# Patient Record
Sex: MALE | Race: WHITE | NOT HISPANIC OR LATINO | Employment: FULL TIME | URBAN - METROPOLITAN AREA
[De-identification: names, ages, dates, MRNs, and addresses within clinical notes are randomized per-mention and may not be internally consistent; named-entity substitution may affect disease eponyms.]

---

## 2017-07-28 ENCOUNTER — APPOINTMENT (OUTPATIENT)
Dept: ULTRASOUND IMAGING | Facility: CLINIC | Age: 32
End: 2017-07-28
Attending: FAMILY MEDICINE
Payer: COMMERCIAL

## 2017-07-28 ENCOUNTER — APPOINTMENT (OUTPATIENT)
Dept: CT IMAGING | Facility: CLINIC | Age: 32
End: 2017-07-28
Attending: FAMILY MEDICINE
Payer: COMMERCIAL

## 2017-07-28 ENCOUNTER — HOSPITAL ENCOUNTER (EMERGENCY)
Facility: CLINIC | Age: 32
Discharge: HOME | End: 2017-07-28
Attending: FAMILY MEDICINE | Admitting: FAMILY MEDICINE
Payer: COMMERCIAL

## 2017-07-28 VITALS
TEMPERATURE: 98.3 F | RESPIRATION RATE: 16 BRPM | OXYGEN SATURATION: 98 % | HEART RATE: 82 BPM | DIASTOLIC BLOOD PRESSURE: 86 MMHG | SYSTOLIC BLOOD PRESSURE: 132 MMHG

## 2017-07-28 DIAGNOSIS — N20.1 CALCULUS OF URETER: ICD-10-CM

## 2017-07-28 DIAGNOSIS — N13.2 HYDRONEPHROSIS WITH RENAL AND URETERAL CALCULUS OBSTRUCTION: ICD-10-CM

## 2017-07-28 DIAGNOSIS — G35 MULTIPLE SCLEROSIS (H): ICD-10-CM

## 2017-07-28 LAB
ALBUMIN UR-MCNC: 10 MG/DL
AMORPH CRY #/AREA URNS HPF: ABNORMAL /HPF
ANION GAP SERPL CALCULATED.3IONS-SCNC: 11 MMOL/L (ref 3–14)
APPEARANCE UR: ABNORMAL
BASOPHILS # BLD AUTO: 0 10E9/L (ref 0–0.2)
BASOPHILS NFR BLD AUTO: 0.2 %
BILIRUB UR QL STRIP: NEGATIVE
BUN SERPL-MCNC: 11 MG/DL (ref 7–30)
CALCIUM SERPL-MCNC: 9.4 MG/DL (ref 8.5–10.1)
CHLORIDE SERPL-SCNC: 108 MMOL/L (ref 94–109)
CO2 SERPL-SCNC: 25 MMOL/L (ref 20–32)
COLOR UR AUTO: YELLOW
CREAT SERPL-MCNC: 0.97 MG/DL (ref 0.66–1.25)
DIFFERENTIAL METHOD BLD: NORMAL
EOSINOPHIL # BLD AUTO: 0 10E9/L (ref 0–0.7)
EOSINOPHIL NFR BLD AUTO: 0.5 %
ERYTHROCYTE [DISTWIDTH] IN BLOOD BY AUTOMATED COUNT: 13.6 % (ref 10–15)
GFR SERPL CREATININE-BSD FRML MDRD: NORMAL ML/MIN/1.7M2
GLUCOSE SERPL-MCNC: 97 MG/DL (ref 70–99)
GLUCOSE UR STRIP-MCNC: NEGATIVE MG/DL
HCT VFR BLD AUTO: 43.9 % (ref 40–53)
HGB BLD-MCNC: 14.4 G/DL (ref 13.3–17.7)
HGB UR QL STRIP: ABNORMAL
IMM GRANULOCYTES # BLD: 0 10E9/L (ref 0–0.4)
IMM GRANULOCYTES NFR BLD: 0.2 %
KETONES UR STRIP-MCNC: NEGATIVE MG/DL
LEUKOCYTE ESTERASE UR QL STRIP: NEGATIVE
LYMPHOCYTES # BLD AUTO: 0.9 10E9/L (ref 0.8–5.3)
LYMPHOCYTES NFR BLD AUTO: 11.3 %
MCH RBC QN AUTO: 29.6 PG (ref 26.5–33)
MCHC RBC AUTO-ENTMCNC: 32.8 G/DL (ref 31.5–36.5)
MCV RBC AUTO: 90 FL (ref 78–100)
MONOCYTES # BLD AUTO: 0.4 10E9/L (ref 0–1.3)
MONOCYTES NFR BLD AUTO: 4.4 %
MUCOUS THREADS #/AREA URNS LPF: PRESENT /LPF
NEUTROPHILS # BLD AUTO: 6.9 10E9/L (ref 1.6–8.3)
NEUTROPHILS NFR BLD AUTO: 83.4 %
NITRATE UR QL: NEGATIVE
NRBC # BLD AUTO: 0 10*3/UL
NRBC BLD AUTO-RTO: 0 /100
PH UR STRIP: 7.5 PH (ref 5–7)
PLATELET # BLD AUTO: 164 10E9/L (ref 150–450)
POTASSIUM SERPL-SCNC: 3.9 MMOL/L (ref 3.4–5.3)
RBC # BLD AUTO: 4.87 10E12/L (ref 4.4–5.9)
RBC #/AREA URNS AUTO: 44 /HPF (ref 0–2)
SODIUM SERPL-SCNC: 144 MMOL/L (ref 133–144)
SP GR UR STRIP: 1.02 (ref 1–1.03)
URN SPEC COLLECT METH UR: ABNORMAL
UROBILINOGEN UR STRIP-MCNC: NORMAL MG/DL (ref 0–2)
WBC # BLD AUTO: 8.2 10E9/L (ref 4–11)
WBC #/AREA URNS AUTO: 0 /HPF (ref 0–2)

## 2017-07-28 PROCEDURE — 81001 URINALYSIS AUTO W/SCOPE: CPT | Performed by: FAMILY MEDICINE

## 2017-07-28 PROCEDURE — 85025 COMPLETE CBC W/AUTO DIFF WBC: CPT | Performed by: FAMILY MEDICINE

## 2017-07-28 PROCEDURE — 25000128 H RX IP 250 OP 636: Performed by: FAMILY MEDICINE

## 2017-07-28 PROCEDURE — 80048 BASIC METABOLIC PNL TOTAL CA: CPT | Performed by: FAMILY MEDICINE

## 2017-07-28 PROCEDURE — 99284 EMERGENCY DEPT VISIT MOD MDM: CPT | Mod: 25

## 2017-07-28 PROCEDURE — 96374 THER/PROPH/DIAG INJ IV PUSH: CPT

## 2017-07-28 PROCEDURE — 93976 VASCULAR STUDY: CPT

## 2017-07-28 PROCEDURE — 99284 EMERGENCY DEPT VISIT MOD MDM: CPT | Mod: Z6 | Performed by: FAMILY MEDICINE

## 2017-07-28 PROCEDURE — 96375 TX/PRO/DX INJ NEW DRUG ADDON: CPT

## 2017-07-28 PROCEDURE — 96361 HYDRATE IV INFUSION ADD-ON: CPT | Mod: 59

## 2017-07-28 PROCEDURE — 74176 CT ABD & PELVIS W/O CONTRAST: CPT

## 2017-07-28 RX ORDER — SODIUM CHLORIDE 9 MG/ML
1000 INJECTION, SOLUTION INTRAVENOUS CONTINUOUS
Status: DISCONTINUED | OUTPATIENT
Start: 2017-07-28 | End: 2017-07-28 | Stop reason: HOSPADM

## 2017-07-28 RX ORDER — TAMSULOSIN HYDROCHLORIDE 0.4 MG/1
0.4 CAPSULE ORAL DAILY
Qty: 10 CAPSULE | Refills: 0 | Status: SHIPPED | OUTPATIENT
Start: 2017-07-28 | End: 2017-08-07

## 2017-07-28 RX ORDER — ONDANSETRON 2 MG/ML
4 INJECTION INTRAMUSCULAR; INTRAVENOUS ONCE
Status: COMPLETED | OUTPATIENT
Start: 2017-07-28 | End: 2017-07-28

## 2017-07-28 RX ORDER — IBUPROFEN 800 MG/1
800 TABLET, FILM COATED ORAL EVERY 8 HOURS PRN
Qty: 20 TABLET | Refills: 0 | Status: SHIPPED | OUTPATIENT
Start: 2017-07-28 | End: 2017-08-05

## 2017-07-28 RX ORDER — GLATIRAMER ACETATE 20 MG/ML
20 INJECTION, SOLUTION SUBCUTANEOUS DAILY
COMMUNITY

## 2017-07-28 RX ORDER — OXYCODONE HYDROCHLORIDE 5 MG/1
5 TABLET ORAL EVERY 6 HOURS PRN
Qty: 20 TABLET | Refills: 0 | Status: SHIPPED | OUTPATIENT
Start: 2017-07-28

## 2017-07-28 RX ORDER — KETOROLAC TROMETHAMINE 30 MG/ML
30 INJECTION, SOLUTION INTRAMUSCULAR; INTRAVENOUS ONCE
Status: COMPLETED | OUTPATIENT
Start: 2017-07-28 | End: 2017-07-28

## 2017-07-28 RX ADMIN — SODIUM CHLORIDE 1000 ML: 9 INJECTION, SOLUTION INTRAVENOUS at 17:27

## 2017-07-28 RX ADMIN — KETOROLAC TROMETHAMINE 30 MG: 30 INJECTION, SOLUTION INTRAMUSCULAR at 17:25

## 2017-07-28 RX ADMIN — ONDANSETRON 4 MG: 2 INJECTION INTRAMUSCULAR; INTRAVENOUS at 17:25

## 2017-07-28 RX ADMIN — SODIUM CHLORIDE 1000 ML: 9 INJECTION, SOLUTION INTRAVENOUS at 19:09

## 2017-07-28 NOTE — ED PROVIDER NOTES
History     Chief Complaint   Patient presents with     Testicular/scrotal Pain     pain in L testicle increasing since this am. + frequent urge to void. Referred here from urgent care.     HPI  Alirio Nolan is a 31 year old male with a history of MS who presents to the Emergency Department for evaluation of testicular pain. Patient reports pain in the left testicle starting this morning and has been worsening since. He left work today around 2:00PM due to pain. He notes sexual activity 3 days ago. He complain of urgency to urinate. He has some nausea secondary to pain.     Past Medical History:   Diagnosis Date     MS (multiple sclerosis) (H)        History reviewed. No pertinent surgical history.    No family history on file.    Social History   Substance Use Topics     Smoking status: Current Every Day Smoker     Smokeless tobacco: Never Used     Alcohol use No       Current Facility-Administered Medications   Medication     0.9% sodium chloride infusion     Current Outpatient Prescriptions   Medication     glatiramer (COPAXONE) 20 MG/ML injection     oxyCODONE (ROXICODONE) 5 MG IR tablet     tamsulosin (FLOMAX) 0.4 MG capsule     ibuprofen (ADVIL/MOTRIN) 800 MG tablet      No Known Allergies    I have reviewed the Medications, Allergies, Past Medical and Surgical History, and Social History in the Epic system.    Review of Systems  All other systems were reviewed and are negative    Physical Exam      Physical Exam   Constitutional: He is oriented to person, place, and time. He appears well-developed and well-nourished.   HENT:   Head: Normocephalic and atraumatic.   Mouth/Throat: Oropharynx is clear and moist.   Eyes: EOM are normal. Pupils are equal, round, and reactive to light.   Neck: Normal range of motion. Neck supple. No tracheal deviation present. No thyromegaly present.   Cardiovascular: Normal rate, regular rhythm, normal heart sounds and intact distal pulses.  Exam reveals no gallop and no  friction rub.    No murmur heard.  Pulmonary/Chest: Effort normal and breath sounds normal. He exhibits no tenderness.   Abdominal: Soft. Bowel sounds are normal. He exhibits no distension and no mass. There is no tenderness.   Genitourinary: Cremasteric reflex is present. Right testis shows no mass, no swelling and no tenderness. Left testis shows tenderness. Left testis shows no mass and no swelling.   Musculoskeletal: He exhibits no edema or tenderness.   Neurological: He is alert and oriented to person, place, and time. No cranial nerve deficit. Coordination normal.   Skin: Skin is warm and dry. No rash noted.   Psychiatric: He has a normal mood and affect. His behavior is normal.   Nursing note and vitals reviewed.      ED Course   4:23 PM  The patient was seen and examined by Lexx Rodriguez MD in Room 20.     ED Course     Procedures             Critical Care time:  none               Labs Ordered and Resulted from Time of ED Arrival Up to the Time of Departure from the ED   ROUTINE UA WITH MICROSCOPIC REFLEX TO CULTURE - Abnormal; Notable for the following:        Result Value    Blood Urine Small (*)     pH Urine 7.5 (*)     Protein Albumin Urine 10 (*)     RBC Urine 44 (*)     Mucous Urine Present (*)     Amorphous Crystals Few (*)     All other components within normal limits   CBC WITH PLATELETS DIFFERENTIAL   BASIC METABOLIC PANEL            Assessments & Plan (with Medical Decision Making)   Patient with a history of kidney stones presenting with acute left testicular and groin pain.  Differential diagnosis includes testicular torsion, epididymitis, varicocele, ureterolithiasis, pyelonephritis, hernia.  His exam does not reveal any significant swelling or matt tenderness of the testicle, and testicular ultrasound was unremarkable.  Labs were unremarkable his pain was improved dramatically with Toradol.  CT confirms 2 mm stone with obstruction at the left UVJ.  Patient is feeling as though his pain is  well-controlled like to go home.  He is familiar with the clinical expected course of kidney stones.  Discussed expected course, need for follow up, and indications for return with the patient.  See discharge instructions.      I have reviewed the nursing notes.    I have reviewed the findings, diagnosis, plan and need for follow up with the patient.    New Prescriptions    IBUPROFEN (ADVIL/MOTRIN) 800 MG TABLET    Take 1 tablet (800 mg) by mouth every 8 hours as needed for moderate pain    OXYCODONE (ROXICODONE) 5 MG IR TABLET    Take 1 tablet (5 mg) by mouth every 6 hours as needed for pain    TAMSULOSIN (FLOMAX) 0.4 MG CAPSULE    Take 1 capsule (0.4 mg) by mouth daily for 10 doses       Final diagnoses:   Calculus of ureter   I, Jaimee Rodriguez, am serving as a trained medical scribe to document services personally performed by Lexx Rodriguez MD, based on the provider's statements to me.      I, Lexx Rodriguez MD, was physically present and have reviewed and verified the accuracy of this note documented by Jaimee Rodriguez.      7/28/2017   [facility], [place], EMERGENCY DEPARTMENT     Yousif Rodriguez MD  07/28/17 4502

## 2017-07-28 NOTE — ED AVS SNAPSHOT
[facility], [place], Emergency Department    [address]    [facility] [address]    Phone:  [phone]    Fax:  [phone]                                       Alirio Nolan   MRN: [id number]    Department:  [facility], Emergency Department   Date of Visit:  7/28/2017           After Visit Summary Signature Page     I have received my discharge instructions, and my questions have been answered. I have discussed any challenges I see with this plan with the nurse or doctor.    ..........................................................................................................................................  Patient/Patient Representative Signature      ..........................................................................................................................................  Patient Representative Print Name and Relationship to Patient    ..................................................               ................................................  Date                                            Time    ..........................................................................................................................................  Reviewed by Signature/Title    ...................................................              ..............................................  Date                                                            Time

## 2017-07-28 NOTE — ED AVS SNAPSHOT
[facility], Emergency Department    [address]    [facility] [place] [address]    Phone:  [phone]    Fax:  [phone]                                       Alirio Nolan   MRN: [id number]    Department:  [facility], Emergency Department   Date of Visit:  7/28/2017           Patient Information     Date Of Birth          1985        Your diagnoses for this visit were:     Calculus of ureter        You were seen by Yousif Rodriguez MD.        Discharge Instructions       Thank you for choosing [facility].     Please closely monitor for further symptoms. Return to the Emergency Department if you develop any new or worsening signs or symptoms.    If you received any opiate pain medications or sedatives during your visit, please do not drive for at least 8 hours.     Labs, cultures or final xray interpretations may still need to be reviewed.  We will call you if your plan of care needs to be changed.    Please make an appointment to follow up with Urology Clinic (phone: [phone]) in 7-14 days unless symptoms completely resolve.      Treating Kidney Stones: Expectant Therapy  Most kidney stones are about the size of a grape seed. Stones of this size are small enough to pass naturally. Once it is passed, a stone can be analyzed. This wait-and-see approach is called expectant therapy. Small stones can often be passed with expectant therapy. If pain is a problem, ask your healthcare provider about pain medicines. Then follow his or her directions on how much water to drink. Drinking more water creates more urine to flush out your stone. Also be sure to strain your urine. Take any stones you pass to your provider for analysis.    Drink lots of water  Drinking lots of water may help your stone pass. Water also dilutes the chemicals in your urine. This reduces your risk of forming new stones. You may be told to drink 8, 12-ounce glasses of water a day. Avoid liquids that  dehydrate you, such as those containing caffeine or alcohol.  Strain your urine  Straining your urine lets you collect your stone for analysis. Use the strainer each time you urinate. Strain your urine for as long as your healthcare provider suggests. Watch for brown, tan, gold, or black specks or tiny tony. These may be kidney stones.  Take your medicine  Your healthcare provider may give you medicine that makes it more likely for you to pass the kidney stone.   Follow up with your healthcare provider  Follow up by taking any stones you find to your provider for analysis. The type of stone you have determines your diet and prevention program. You may need more tests in the future. These tests will ensure that new stones are not forming.  Date Last Reviewed: 1/1/2017 2000-2017 [employer]. [address] [place] [address], [place] [address]. All rights reserved. This information is not intended as a substitute for professional medical care. Always follow your healthcare professional's instructions.            24 Hour Appointment Hotline       To make an appointment at any [facility], call [phone] ([phone]). If you don't have a family doctor or clinic, we will help you find one. [facility] clinics are conveniently located to serve the needs of you and your family.             Review of your medicines      START taking        Dose / Directions Last dose taken    ibuprofen 800 MG tablet   Commonly known as:  ADVIL/MOTRIN   Dose:  800 mg   Quantity:  20 tablet        Take 1 tablet (800 mg) by mouth every 8 hours as needed for moderate pain   Refills:  0        oxyCODONE 5 MG IR tablet   Commonly known as:  ROXICODONE   Dose:  5 mg   Quantity:  20 tablet        Take 1 tablet (5 mg) by mouth every 6 hours as needed for pain   Refills:  0        tamsulosin 0.4 MG capsule   Commonly known as:  FLOMAX   Dose:  0.4 mg   Quantity:  10 capsule        Take 1 capsule (0.4 mg) by mouth daily for 10 doses    Refills:  0          Our records show that you are taking the medicines listed below. If these are incorrect, please call your family doctor or clinic.        Dose / Directions Last dose taken    glatiramer 20 MG/ML injection   Commonly known as:  COPAXONE   Dose:  20 mg        Inject 20 mg Subcutaneous daily   Refills:  0                Prescriptions were sent or printed at these locations (3 Prescriptions)                   Other Prescriptions                Printed at Department/Unit printer (3 of 3)         oxyCODONE (ROXICODONE) 5 MG IR tablet               tamsulosin (FLOMAX) 0.4 MG capsule               ibuprofen (ADVIL/MOTRIN) 800 MG tablet                Procedures and tests performed during your visit     Abd/pelvis CT no contrast - Stone Protocol    Basic metabolic panel    CBC with platelets differential    UA with Microscopic reflex to Culture    US Testicular & Scrotum w Doppler Ltd      Orders Needing Specimen Collection     None      Pending Results     No orders found from 7/26/2017 to 7/29/2017.            Pending Culture Results     No orders found from 7/26/2017 to 7/29/2017.            Pending Results Instructions     If you had any lab results that were not finalized at the time of your Discharge, you can call the ED Lab Result RN at [phone]. You will be contacted by this team for any positive Lab results or changes in treatment. The nurses are available 7 days a week from 10A to 6:30P.  You can leave a message 24 hours per day and they will return your call.        Thank you for choosing [facility]       Thank you for choosing [facility] for your care. Our goal is always to provide you with excellent care. Hearing back from our patients is one way we can continue to improve our services. Please take a few minutes to complete the written survey that you may receive in the mail after you visit with us. Thank you!        [employer] Information     [employer] lets you send messages to your doctor,  "view your test results, renew your prescriptions, schedule appointments and more. To sign up, go to [email or web address] . Click on \"Log in\" on the left side of the screen, which will take you to the Welcome page. Then click on \"Sign up Now\" on the right side of the page.     You will be asked to enter the access code listed below, as well as some personal information. Please follow the directions to create your username and password.     Your access code is: [email or web address]  Expires: 10/26/2017  7:45 PM     Your access code will  in 90 days. If you need help or a new code, please call your [facility] clinic or [phone].        Care EveryWhere ID     This is your Care EveryWhere ID. This could be used by other organizations to access your [facility] medical records  [id number]        Equal Access to Services     ДМИТРИЙ NDIAYE : Hadii rocio Johnson, waaxda luanita, qaybta kaalmada modesta, anirudh orta . So [facility] [phone].    ATENCIÓN: Si habla español, tiene a pleitez disposición servicios gratuitos de asistencia lingüística. Llame al [phone].    We comply with applicable federal civil rights laws and [place] laws. We do not discriminate on the basis of race, color, national origin, age, disability sex, sexual orientation or gender identity.            After Visit Summary       This is your record. Keep this with you and show to your community pharmacist(s) and doctor(s) at your next visit.                  "

## 2017-07-29 NOTE — DISCHARGE INSTRUCTIONS
Thank you for choosing [facility].     Please closely monitor for further symptoms. Return to the Emergency Department if you develop any new or worsening signs or symptoms.    If you received any opiate pain medications or sedatives during your visit, please do not drive for at least 8 hours.     Labs, cultures or final xray interpretations may still need to be reviewed.  We will call you if your plan of care needs to be changed.    Please make an appointment to follow up with Urology Clinic (phone: [phone]) in 7-14 days unless symptoms completely resolve.      Treating Kidney Stones: Expectant Therapy  Most kidney stones are about the size of a grape seed. Stones of this size are small enough to pass naturally. Once it is passed, a stone can be analyzed. This wait-and-see approach is called expectant therapy. Small stones can often be passed with expectant therapy. If pain is a problem, ask your healthcare provider about pain medicines. Then follow his or her directions on how much water to drink. Drinking more water creates more urine to flush out your stone. Also be sure to strain your urine. Take any stones you pass to your provider for analysis.    Drink lots of water  Drinking lots of water may help your stone pass. Water also dilutes the chemicals in your urine. This reduces your risk of forming new stones. You may be told to drink 8, 12-ounce glasses of water a day. Avoid liquids that dehydrate you, such as those containing caffeine or alcohol.  Strain your urine  Straining your urine lets you collect your stone for analysis. Use the strainer each time you urinate. Strain your urine for as long as your healthcare provider suggests. Watch for brown, tan, gold, or black specks or tiny tony. These may be kidney stones.  Take your medicine  Your healthcare provider may give you medicine that makes it more likely for you to pass the kidney stone.   Follow up with your  healthcare provider  Follow up by taking any stones you find to your provider for analysis. The type of stone you have determines your diet and prevention program. You may need more tests in the future. These tests will ensure that new stones are not forming.  Date Last Reviewed: 1/1/2017 2000-2017 [employer]. [address] [place] [address], [place] [address]. All rights reserved. This information is not intended as a substitute for professional medical care. Always follow your healthcare professional's instructions.

## 2024-10-14 ENCOUNTER — APPOINTMENT (OUTPATIENT)
Dept: GENERAL RADIOLOGY | Facility: CLINIC | Age: 39
DRG: 638 | End: 2024-10-14
Attending: EMERGENCY MEDICINE
Payer: COMMERCIAL

## 2024-10-14 ENCOUNTER — HOSPITAL ENCOUNTER (INPATIENT)
Facility: CLINIC | Age: 39
LOS: 3 days | Discharge: HOME | DRG: 638 | End: 2024-10-17
Attending: EMERGENCY MEDICINE | Admitting: INTERNAL MEDICINE
Payer: COMMERCIAL

## 2024-10-14 DIAGNOSIS — N48.29 OTHER INFLAMMATORY DISORDERS OF PENIS: ICD-10-CM

## 2024-10-14 DIAGNOSIS — E11.9 TYPE 2 DIABETES MELLITUS WITHOUT COMPLICATION, UNSPECIFIED WHETHER LONG TERM INSULIN USE (H): ICD-10-CM

## 2024-10-14 DIAGNOSIS — H53.8 BLURRED VISION: Primary | ICD-10-CM

## 2024-10-14 DIAGNOSIS — E11.10 DIABETIC KETOACIDOSIS WITHOUT COMA ASSOCIATED WITH TYPE 2 DIABETES MELLITUS (H): ICD-10-CM

## 2024-10-14 DIAGNOSIS — L02.92 BOIL: ICD-10-CM

## 2024-10-14 LAB
ALBUMIN SERPL BCG-MCNC: 3.6 G/DL (ref 3.5–5.2)
ALBUMIN SERPL BCG-MCNC: 3.8 G/DL (ref 3.5–5.2)
ALBUMIN UR-MCNC: 50 MG/DL
ALP SERPL-CCNC: 104 U/L (ref 40–150)
ALP SERPL-CCNC: 105 U/L (ref 40–150)
ALT SERPL W P-5'-P-CCNC: 48 U/L (ref 0–70)
ALT SERPL W P-5'-P-CCNC: 49 U/L (ref 0–70)
ANION GAP SERPL CALCULATED.3IONS-SCNC: 20 MMOL/L (ref 7–15)
ANION GAP SERPL CALCULATED.3IONS-SCNC: 24 MMOL/L (ref 7–15)
APPEARANCE UR: CLEAR
AST SERPL W P-5'-P-CCNC: 28 U/L (ref 0–45)
AST SERPL W P-5'-P-CCNC: 28 U/L (ref 0–45)
B-OH-BUTYR SERPL-SCNC: 7.16 MMOL/L
B-OH-BUTYR SERPL-SCNC: 7.24 MMOL/L
BASE EXCESS BLDV CALC-SCNC: -15.5 MMOL/L (ref -3–3)
BASOPHILS # BLD AUTO: 0 10E3/UL (ref 0–0.2)
BASOPHILS NFR BLD AUTO: 1 %
BILIRUB DIRECT SERPL-MCNC: <0.2 MG/DL (ref 0–0.3)
BILIRUB SERPL-MCNC: 0.4 MG/DL
BILIRUB SERPL-MCNC: 0.5 MG/DL
BILIRUB UR QL STRIP: NEGATIVE
BUN SERPL-MCNC: 7.6 MG/DL (ref 6–20)
BUN SERPL-MCNC: 7.8 MG/DL (ref 6–20)
CALCIUM SERPL-MCNC: 7.5 MG/DL (ref 8.8–10.4)
CALCIUM SERPL-MCNC: 8.2 MG/DL (ref 8.8–10.4)
CHLORIDE SERPL-SCNC: 103 MMOL/L (ref 98–107)
CHLORIDE SERPL-SCNC: 108 MMOL/L (ref 98–107)
COLOR UR AUTO: ABNORMAL
CREAT SERPL-MCNC: 0.76 MG/DL (ref 0.67–1.17)
CREAT SERPL-MCNC: 0.95 MG/DL (ref 0.67–1.17)
CRP SERPL-MCNC: 18.9 MG/L
EGFRCR SERPLBLD CKD-EPI 2021: >90 ML/MIN/1.73M2
EGFRCR SERPLBLD CKD-EPI 2021: >90 ML/MIN/1.73M2
EOSINOPHIL # BLD AUTO: 0.1 10E3/UL (ref 0–0.7)
EOSINOPHIL NFR BLD AUTO: 1 %
ERYTHROCYTE [DISTWIDTH] IN BLOOD BY AUTOMATED COUNT: 14.1 % (ref 10–15)
ERYTHROCYTE [SEDIMENTATION RATE] IN BLOOD BY WESTERGREN METHOD: 25 MM/HR (ref 0–15)
EST. AVERAGE GLUCOSE BLD GHB EST-MCNC: 298 MG/DL
GLUCOSE BLDC GLUCOMTR-MCNC: 228 MG/DL (ref 70–99)
GLUCOSE BLDC GLUCOMTR-MCNC: 279 MG/DL (ref 70–99)
GLUCOSE BLDC GLUCOMTR-MCNC: 286 MG/DL (ref 70–99)
GLUCOSE SERPL-MCNC: 245 MG/DL (ref 70–99)
GLUCOSE SERPL-MCNC: 266 MG/DL (ref 70–99)
GLUCOSE UR STRIP-MCNC: >=1000 MG/DL
HBA1C MFR BLD: 12 %
HCO3 BLDV-SCNC: 12 MMOL/L (ref 21–28)
HCO3 SERPL-SCNC: 10 MMOL/L (ref 22–29)
HCO3 SERPL-SCNC: 9 MMOL/L (ref 22–29)
HCT VFR BLD AUTO: 44.4 % (ref 40–53)
HGB BLD-MCNC: 15 G/DL (ref 13.3–17.7)
HGB UR QL STRIP: NEGATIVE
HYALINE CASTS: 6 /LPF
IMM GRANULOCYTES # BLD: 0.1 10E3/UL
IMM GRANULOCYTES NFR BLD: 1 %
KETONES UR STRIP-MCNC: >150 MG/DL
LEUKOCYTE ESTERASE UR QL STRIP: NEGATIVE
LYMPHOCYTES # BLD AUTO: 0.9 10E3/UL (ref 0.8–5.3)
LYMPHOCYTES NFR BLD AUTO: 12 %
MAGNESIUM SERPL-MCNC: 1.9 MG/DL (ref 1.7–2.3)
MCH RBC QN AUTO: 29.9 PG (ref 26.5–33)
MCHC RBC AUTO-ENTMCNC: 33.8 G/DL (ref 31.5–36.5)
MCV RBC AUTO: 89 FL (ref 78–100)
MONOCYTES # BLD AUTO: 0.5 10E3/UL (ref 0–1.3)
MONOCYTES NFR BLD AUTO: 7 %
MUCOUS THREADS #/AREA URNS LPF: PRESENT /LPF
NEUTROPHILS # BLD AUTO: 5.8 10E3/UL (ref 1.6–8.3)
NEUTROPHILS NFR BLD AUTO: 79 %
NITRATE UR QL: NEGATIVE
NRBC # BLD AUTO: 0 10E3/UL
NRBC BLD AUTO-RTO: 0 /100
O2/TOTAL GAS SETTING VFR VENT: 99 %
OSMOLALITY SERPL: 311 MMOL/KG (ref 275–295)
OXYHGB MFR BLDV: 69 % (ref 70–75)
PCO2 BLDV: 32 MM HG (ref 40–50)
PH BLDV: 7.17 [PH] (ref 7.32–7.43)
PH UR STRIP: 5.5 [PH] (ref 5–7)
PHOSPHATE SERPL-MCNC: 2.3 MG/DL (ref 2.5–4.5)
PHOSPHATE SERPL-MCNC: 2.6 MG/DL (ref 2.5–4.5)
PLATELET # BLD AUTO: 186 10E3/UL (ref 150–450)
PO2 BLDV: 40 MM HG (ref 25–47)
POTASSIUM SERPL-SCNC: 3.9 MMOL/L (ref 3.4–5.3)
POTASSIUM SERPL-SCNC: 4.1 MMOL/L (ref 3.4–5.3)
PROT SERPL-MCNC: 7.6 G/DL (ref 6.4–8.3)
PROT SERPL-MCNC: 7.8 G/DL (ref 6.4–8.3)
RBC # BLD AUTO: 5.01 10E6/UL (ref 4.4–5.9)
RBC URINE: 2 /HPF
SAO2 % BLDV: 70.9 % (ref 70–75)
SODIUM SERPL-SCNC: 136 MMOL/L (ref 135–145)
SODIUM SERPL-SCNC: 138 MMOL/L (ref 135–145)
SP GR UR STRIP: 1.03 (ref 1–1.03)
SQUAMOUS EPITHELIAL: 1 /HPF
TSH SERPL DL<=0.005 MIU/L-ACNC: 1.75 UIU/ML (ref 0.3–4.2)
UROBILINOGEN UR STRIP-MCNC: NORMAL MG/DL
WBC # BLD AUTO: 7.4 10E3/UL (ref 4–11)
WBC URINE: 5 /HPF

## 2024-10-14 PROCEDURE — 86140 C-REACTIVE PROTEIN: CPT | Performed by: EMERGENCY MEDICINE

## 2024-10-14 PROCEDURE — 84100 ASSAY OF PHOSPHORUS: CPT | Performed by: PHYSICIAN ASSISTANT

## 2024-10-14 PROCEDURE — 250N000013 HC RX MED GY IP 250 OP 250 PS 637: Performed by: PHYSICIAN ASSISTANT

## 2024-10-14 PROCEDURE — 250N000011 HC RX IP 250 OP 636: Performed by: PHYSICIAN ASSISTANT

## 2024-10-14 PROCEDURE — 82248 BILIRUBIN DIRECT: CPT | Performed by: EMERGENCY MEDICINE

## 2024-10-14 PROCEDURE — 99291 CRITICAL CARE FIRST HOUR: CPT | Performed by: EMERGENCY MEDICINE

## 2024-10-14 PROCEDURE — 258N000003 HC RX IP 258 OP 636: Performed by: PHYSICIAN ASSISTANT

## 2024-10-14 PROCEDURE — 250N000009 HC RX 250: Performed by: PHYSICIAN ASSISTANT

## 2024-10-14 PROCEDURE — 83735 ASSAY OF MAGNESIUM: CPT | Performed by: PHYSICIAN ASSISTANT

## 2024-10-14 PROCEDURE — 74019 RADEX ABDOMEN 2 VIEWS: CPT

## 2024-10-14 PROCEDURE — 82962 GLUCOSE BLOOD TEST: CPT

## 2024-10-14 PROCEDURE — 250N000013 HC RX MED GY IP 250 OP 250 PS 637: Performed by: EMERGENCY MEDICINE

## 2024-10-14 PROCEDURE — 99291 CRITICAL CARE FIRST HOUR: CPT | Mod: 25 | Performed by: EMERGENCY MEDICINE

## 2024-10-14 PROCEDURE — 83930 ASSAY OF BLOOD OSMOLALITY: CPT | Performed by: PHYSICIAN ASSISTANT

## 2024-10-14 PROCEDURE — 36415 COLL VENOUS BLD VENIPUNCTURE: CPT | Performed by: PHYSICIAN ASSISTANT

## 2024-10-14 PROCEDURE — 82010 KETONE BODYS QUAN: CPT | Performed by: PHYSICIAN ASSISTANT

## 2024-10-14 PROCEDURE — 120N000002 HC R&B MED SURG/OB UMMC

## 2024-10-14 PROCEDURE — 80048 BASIC METABOLIC PNL TOTAL CA: CPT | Performed by: PHYSICIAN ASSISTANT

## 2024-10-14 PROCEDURE — 82805 BLOOD GASES W/O2 SATURATION: CPT | Performed by: EMERGENCY MEDICINE

## 2024-10-14 PROCEDURE — 258N000003 HC RX IP 258 OP 636: Performed by: EMERGENCY MEDICINE

## 2024-10-14 PROCEDURE — 83036 HEMOGLOBIN GLYCOSYLATED A1C: CPT | Performed by: EMERGENCY MEDICINE

## 2024-10-14 PROCEDURE — 84443 ASSAY THYROID STIM HORMONE: CPT | Performed by: EMERGENCY MEDICINE

## 2024-10-14 PROCEDURE — 96360 HYDRATION IV INFUSION INIT: CPT | Performed by: EMERGENCY MEDICINE

## 2024-10-14 PROCEDURE — 36415 COLL VENOUS BLD VENIPUNCTURE: CPT | Performed by: EMERGENCY MEDICINE

## 2024-10-14 PROCEDURE — 85652 RBC SED RATE AUTOMATED: CPT | Performed by: EMERGENCY MEDICINE

## 2024-10-14 PROCEDURE — 81001 URINALYSIS AUTO W/SCOPE: CPT | Performed by: EMERGENCY MEDICINE

## 2024-10-14 PROCEDURE — 85025 COMPLETE CBC W/AUTO DIFF WBC: CPT | Performed by: EMERGENCY MEDICINE

## 2024-10-14 PROCEDURE — 82010 KETONE BODYS QUAN: CPT | Performed by: EMERGENCY MEDICINE

## 2024-10-14 PROCEDURE — 99222 1ST HOSP IP/OBS MODERATE 55: CPT | Performed by: PHYSICIAN ASSISTANT

## 2024-10-14 RX ORDER — LIDOCAINE 40 MG/G
CREAM TOPICAL
Status: DISCONTINUED | OUTPATIENT
Start: 2024-10-14 | End: 2024-10-17 | Stop reason: HOSPADM

## 2024-10-14 RX ORDER — AMOXICILLIN 250 MG
2 CAPSULE ORAL 2 TIMES DAILY
Status: DISCONTINUED | OUTPATIENT
Start: 2024-10-14 | End: 2024-10-17 | Stop reason: HOSPADM

## 2024-10-14 RX ORDER — NICOTINE POLACRILEX 4 MG
15-30 LOZENGE BUCCAL
Status: DISCONTINUED | OUTPATIENT
Start: 2024-10-14 | End: 2024-10-17 | Stop reason: HOSPADM

## 2024-10-14 RX ORDER — DEXTROSE MONOHYDRATE AND SODIUM CHLORIDE 5; .45 G/100ML; G/100ML
1000 INJECTION, SOLUTION INTRAVENOUS CONTINUOUS PRN
Status: DISCONTINUED | OUTPATIENT
Start: 2024-10-14 | End: 2024-10-14

## 2024-10-14 RX ORDER — DEXTROSE MONOHYDRATE 25 G/50ML
25-50 INJECTION, SOLUTION INTRAVENOUS
Status: DISCONTINUED | OUTPATIENT
Start: 2024-10-14 | End: 2024-10-17 | Stop reason: HOSPADM

## 2024-10-14 RX ORDER — AMOXICILLIN 250 MG
2 CAPSULE ORAL 2 TIMES DAILY PRN
Status: DISCONTINUED | OUTPATIENT
Start: 2024-10-14 | End: 2024-10-14

## 2024-10-14 RX ORDER — AMOXICILLIN 250 MG
1 CAPSULE ORAL 2 TIMES DAILY PRN
Status: DISCONTINUED | OUTPATIENT
Start: 2024-10-14 | End: 2024-10-14

## 2024-10-14 RX ORDER — CALCIUM CARBONATE 500 MG/1
1000 TABLET, CHEWABLE ORAL 4 TIMES DAILY PRN
Status: DISCONTINUED | OUTPATIENT
Start: 2024-10-14 | End: 2024-10-14

## 2024-10-14 RX ORDER — SODIUM CHLORIDE AND POTASSIUM CHLORIDE 150; 450 MG/100ML; MG/100ML
INJECTION, SOLUTION INTRAVENOUS CONTINUOUS
Status: DISCONTINUED | OUTPATIENT
Start: 2024-10-14 | End: 2024-10-15

## 2024-10-14 RX ORDER — NYSTATIN 100000 U/G
CREAM TOPICAL 2 TIMES DAILY
Status: DISCONTINUED | OUTPATIENT
Start: 2024-10-14 | End: 2024-10-17 | Stop reason: HOSPADM

## 2024-10-14 RX ORDER — SODIUM CHLORIDE 9 MG/ML
1000 INJECTION, SOLUTION INTRAVENOUS CONTINUOUS
Status: DISCONTINUED | OUTPATIENT
Start: 2024-10-14 | End: 2024-10-14

## 2024-10-14 RX ORDER — NYSTATIN 100000 [USP'U]/ML
500000 SUSPENSION ORAL 4 TIMES DAILY
Status: DISCONTINUED | OUTPATIENT
Start: 2024-10-14 | End: 2024-10-17 | Stop reason: HOSPADM

## 2024-10-14 RX ORDER — SULFAMETHOXAZOLE AND TRIMETHOPRIM 800; 160 MG/1; MG/1
1 TABLET ORAL 2 TIMES DAILY
Status: DISCONTINUED | OUTPATIENT
Start: 2024-10-14 | End: 2024-10-14

## 2024-10-14 RX ORDER — SULFAMETHOXAZOLE AND TRIMETHOPRIM 800; 160 MG/1; MG/1
2 TABLET ORAL 2 TIMES DAILY
Status: DISCONTINUED | OUTPATIENT
Start: 2024-10-14 | End: 2024-10-17 | Stop reason: HOSPADM

## 2024-10-14 RX ORDER — GLATIRAMER ACETATE 20 MG/ML
20 INJECTION, SOLUTION SUBCUTANEOUS
Status: DISCONTINUED | OUTPATIENT
Start: 2024-10-14 | End: 2024-10-16

## 2024-10-14 RX ORDER — POTASSIUM CHLORIDE 7.45 MG/ML
10 INJECTION INTRAVENOUS ONCE
Status: COMPLETED | OUTPATIENT
Start: 2024-10-14 | End: 2024-10-14

## 2024-10-14 RX ORDER — DEXTROSE MONOHYDRATE, SODIUM CHLORIDE, AND POTASSIUM CHLORIDE 50; 1.49; 4.5 G/1000ML; G/1000ML; G/1000ML
INJECTION, SOLUTION INTRAVENOUS CONTINUOUS
Status: DISCONTINUED | OUTPATIENT
Start: 2024-10-14 | End: 2024-10-15

## 2024-10-14 RX ORDER — DEXTROSE MONOHYDRATE 25 G/50ML
25-50 INJECTION, SOLUTION INTRAVENOUS
Status: DISCONTINUED | OUTPATIENT
Start: 2024-10-14 | End: 2024-10-14

## 2024-10-14 RX ADMIN — CEPHALEXIN 500 MG: 500 CAPSULE ORAL at 21:32

## 2024-10-14 RX ADMIN — SODIUM CHLORIDE 1000 ML: 9 INJECTION, SOLUTION INTRAVENOUS at 18:31

## 2024-10-14 RX ADMIN — SODIUM CHLORIDE 1000 ML: 9 INJECTION, SOLUTION INTRAVENOUS at 20:44

## 2024-10-14 RX ADMIN — SULFAMETHOXAZOLE AND TRIMETHOPRIM 2 TABLET: 800; 160 TABLET ORAL at 21:32

## 2024-10-14 RX ADMIN — SODIUM CHLORIDE 1000 ML: 9 INJECTION, SOLUTION INTRAVENOUS at 19:37

## 2024-10-14 RX ADMIN — POTASSIUM CHLORIDE 10 MEQ: 7.46 INJECTION, SOLUTION INTRAVENOUS at 21:17

## 2024-10-14 RX ADMIN — SENNOSIDES AND DOCUSATE SODIUM 2 TABLET: 8.6; 5 TABLET ORAL at 21:33

## 2024-10-14 RX ADMIN — INSULIN HUMAN 3.5 UNITS/HR: 1 INJECTION, SOLUTION INTRAVENOUS at 20:49

## 2024-10-14 ASSESSMENT — COLUMBIA-SUICIDE SEVERITY RATING SCALE - C-SSRS
2. HAVE YOU ACTUALLY HAD ANY THOUGHTS OF KILLING YOURSELF IN THE PAST MONTH?: NO
6. HAVE YOU EVER DONE ANYTHING, STARTED TO DO ANYTHING, OR PREPARED TO DO ANYTHING TO END YOUR LIFE?: NO
1. IN THE PAST MONTH, HAVE YOU WISHED YOU WERE DEAD OR WISHED YOU COULD GO TO SLEEP AND NOT WAKE UP?: NO

## 2024-10-14 ASSESSMENT — ACTIVITIES OF DAILY LIVING (ADL)
ADLS_ACUITY_SCORE: 35

## 2024-10-14 ASSESSMENT — VISUAL ACUITY
OS: 20/40
OD: 20/20

## 2024-10-14 NOTE — ED PROVIDER NOTES
History     Chief Complaint   Patient presents with    dry mouth    Eye Problem     Left eye    Constipation     HPI  Alirio Nolan is a 38 year old male with a past medical history of MS, depression who presents to the emergency department with multiple complaints.  Most of his symptoms have been present for about a week and a half.  The patient states that he is concerned that he may have a yeast infection on his penis and tongue.  His fiancée currently has a yeast infection.  He feels that his mouth is dry.  He also notes some white patches on his tongue.  He notes he has been very thirsty lately and has had increased urination.  Patient also notes development of frequent boils to his bilateral inner thigh area, no current areas of fluctuance, but he has had several areas drained on their own recently.  He feels that there is redness to the tip of his penis and the area feels irritated.  He notes that when he urinates, it sometimes irritates this area, but he does not think that he has urethral pain/dysuria.    Patient also reports that he is constipated, his last bowel movement was over 1 week ago and was quite hard, the patient reports he now has a hemorrhoid.  Patient continues to pass flatus, but feels that he needs to have a bowel movement, but has not been able to pass this.  Patient reports that he he feels that his stomach is full due to this and has not been eating as much as usual.  The patient denies any history of abdominal surgeries.  He also feels nauseated.  He notes generalized weakness for the past several days.  He recently traveled to [place] with his family for vacation.    The patient also reports that a week and a half ago, his vision went slightly blurry.  His vision has remained blurred.  No pain in the eye.  The patient does not wear contacts or glasses.  He does have a family history of diabetes in his grandfather.  The patient also has a history of MS.  He has not had visual symptoms  with this before.    The patient's only medication is Lexapro.    I have reviewed the Medications, Allergies, Past Medical and Surgical History, and Social History in the [employer] system.    Past Medical History:   Diagnosis Date    MS (multiple sclerosis) (H)      History reviewed. No pertinent surgical history.  No current facility-administered medications for this encounter.     Current Outpatient Medications   Medication Sig Dispense Refill    glatiramer (COPAXONE) 20 MG/ML injection Inject 20 mg Subcutaneous daily       No Known Allergies  Past medical history, past surgical history, medications, and allergies were reviewed with the patient. Additional pertinent items: None    Social History     Socioeconomic History    Marital status: Single     Spouse name: Not on file    Number of children: Not on file    Years of education: Not on file    Highest education level: Not on file   Occupational History    Not on file   Tobacco Use    Smoking status: Every Day    Smokeless tobacco: Never   Substance and Sexual Activity    Alcohol use: No    Drug use: No    Sexual activity: Not on file   Other Topics Concern    Not on file   Social History Narrative    Not on file     Social Determinants of Health     Financial Resource Strain: High Risk (1/1/2022)    Received from [employer]    Financial Resource Strain     Difficulty of Paying Living Expenses: Not on file     Difficulty of Paying Living Expenses: Not on file   Food Insecurity: Not on file   Transportation Needs: Not on file   Physical Activity: Not on file   Stress: Not on file   Social Connections: Unknown (1/1/2022)    Received from [employer]    Social Connections     Frequency of Communication with Friends and Family: Not on file   Interpersonal Safety: Not on file   Housing Stability: Not on file     Social history was reviewed with the patient. Additional pertinent items: None    Review of  "Systems  A medically appropriate review of systems was performed with pertinent positives and negatives noted in the HPI, and all other systems negative.    Physical Exam   BP: (!) 151/97  Pulse: 93  Temp: 97.6  F (36.4  C)  Resp: 18  Height: 177.8 cm (5' 10\")  Weight: (!) 174.8 kg (385 lb 4.8 oz)  SpO2: 99 %      General: Well nourished, well developed, NAD  HEENT: EOMI, anicteric. NCAT, MMM  Neck: no jugular venous distension, supple, nl ROM  Cardiac: Regular rate and rhythm. No murmurs, rubs, or gallops. Normal S1, S2.  Intact peripheral pulses  Pulm: CTAB, no stridor, wheezes, rales, rhonchi  Abd: Soft, obese, nontender, nondistended.  No masses palpated.  Genitourinary: Erythema to the glans, otherwise penis appears normal, no urethral discharge  Skin: Warm and dry to the touch.  Scattered open wounds to bilateral inner thighs, wound to right inner thigh with scant amount of clear drainage, wounds surrounded by small amount of induration and erythema  Extremities: No LE edema, no cyanosis, w/w/p  Neuro: A&Ox3, no gross focal deficits    ED Course        Procedures                        Labs Ordered and Resulted from Time of ED Arrival to Time of ED Departure   COMPREHENSIVE METABOLIC PANEL - Abnormal       Result Value    Sodium 136      Potassium 4.1      Carbon Dioxide (CO2) 9 (*)     Anion Gap 24 (*)     Urea Nitrogen 7.8      Creatinine 0.76      GFR Estimate >90      Calcium 8.2 (*)     Chloride 103      Glucose 266 (*)     Alkaline Phosphatase 105      AST 28      ALT 48      Protein Total 7.6      Albumin 3.8      Bilirubin Total 0.5     ROUTINE UA WITH MICROSCOPIC REFLEX TO CULTURE - Abnormal    Color Urine Light Yellow      Appearance Urine Clear      Glucose Urine >=1000 (*)     Bilirubin Urine Negative      Ketones Urine >150 (*)     Specific Gravity Urine 1.030      Blood Urine Negative      pH Urine 5.5      Protein Albumin Urine 50 (*)     Urobilinogen Urine Normal      Nitrite Urine Negative   "    Leukocyte Esterase Urine Negative      Mucus Urine Present (*)     RBC Urine 2      WBC Urine 5      Squamous Epithelials Urine 1      Hyaline Casts Urine 6 (*)    CRP INFLAMMATION - Abnormal    CRP Inflammation 18.90 (*)    ERYTHROCYTE SEDIMENTATION RATE AUTO - Abnormal    Erythrocyte Sedimentation Rate 25 (*)    HEMOGLOBIN A1C - Abnormal    Estimated Average Glucose 298 (*)     Hemoglobin A1C 12.0 (*)    GLUCOSE BY METER - Abnormal    GLUCOSE BY METER POCT 286 (*)    KETONE BETA-HYDROXYBUTYRATE QUANTITATIVE, RAPID - Abnormal    Ketone (Beta-Hydroxybutyrate) Quantitative 7.16 (*)    BLOOD GAS VENOUS - Abnormal    pH Venous 7.17 (*)     pCO2 Venous 32 (*)     pO2 Venous 40      Bicarbonate Venous 12 (*)     Base Excess/Deficit Venous -15.5 (*)     FIO2 99      Oxyhemoglobin Venous 69 (*)     O2 Sat, Venous 70.9     BASIC METABOLIC PANEL - Abnormal    Sodium 138      Potassium 3.9      Chloride 108 (*)     Carbon Dioxide (CO2) 10 (*)     Anion Gap 20 (*)     Urea Nitrogen 7.6      Creatinine 0.95      GFR Estimate >90      Calcium 7.5 (*)     Glucose 245 (*)    KETONE BETA-HYDROXYBUTYRATE QUANTITATIVE, RAPID - Abnormal    Ketone (Beta-Hydroxybutyrate) Quantitative 7.24 (*)    OSMOLALITY - Abnormal    Osmolality Blood 311 (*)    GLUCOSE BY METER - Abnormal    GLUCOSE BY METER POCT 279 (*)    PHOSPHORUS - Abnormal    Phosphorus 2.3 (*)    TSH WITH FREE T4 REFLEX - Normal    TSH 1.75     PHOSPHORUS - Normal    Phosphorus 2.6     MAGNESIUM - Normal    Magnesium 1.9     HEPATIC FUNCTION PANEL - Normal    Protein Total 7.8      Albumin 3.6      Bilirubin Total 0.4      Alkaline Phosphatase 104      AST 28      ALT 49      Bilirubin Direct <0.20     GLUCOSE MONITOR NURSING POCT   CBC WITH PLATELETS AND DIFFERENTIAL    WBC Count 7.4      RBC Count 5.01      Hemoglobin 15.0      Hematocrit 44.4      MCV 89      MCH 29.9      MCHC 33.8      RDW 14.1      Platelet Count 186      % Neutrophils 79      % Lymphocytes 12      %  Monocytes 7      % Eosinophils 1      % Basophils 1      % Immature Granulocytes 1      NRBCs per 100 WBC 0      Absolute Neutrophils 5.8      Absolute Lymphocytes 0.9      Absolute Monocytes 0.5      Absolute Eosinophils 0.1      Absolute Basophils 0.0      Absolute Immature Granulocytes 0.1      Absolute NRBCs 0.0     GLUCOSE MONITOR NURSING POCT   GLUCOSE MONITOR NURSING POCT   BLOOD GAS ARTERIAL   BLOOD GAS VENOUS   GLUCOSE MONITOR NURSING POCT            Results for orders placed or performed during the hospital encounter of 10/14/24 (from the past 24 hour(s))   CBC with platelets differential    Narrative    The following orders were created for panel order CBC with platelets differential.  Procedure                               Abnormality         Status                     ---------                               -----------         ------                     CBC with platelets and d...[[id number]]                      Final result                 Please view results for these tests on the individual orders.   Comprehensive metabolic panel   Result Value Ref Range    Sodium 136 135 - 145 mmol/L    Potassium 4.1 3.4 - 5.3 mmol/L    Carbon Dioxide (CO2) 9 (LL) 22 - 29 mmol/L    Anion Gap 24 (H) 7 - 15 mmol/L    Urea Nitrogen 7.8 6.0 - 20.0 mg/dL    Creatinine 0.76 0.67 - 1.17 mg/dL    GFR Estimate >90 >60 mL/min/1.73m2    Calcium 8.2 (L) 8.8 - 10.4 mg/dL    Chloride 103 98 - 107 mmol/L    Glucose 266 (H) 70 - 99 mg/dL    Alkaline Phosphatase 105 40 - 150 U/L    AST 28 0 - 45 U/L    ALT 48 0 - 70 U/L    Protein Total 7.6 6.4 - 8.3 g/dL    Albumin 3.8 3.5 - 5.2 g/dL    Bilirubin Total 0.5 <=1.2 mg/dL   TSH with free T4 reflex   Result Value Ref Range    TSH 1.75 0.30 - 4.20 uIU/mL   CRP inflammation   Result Value Ref Range    CRP Inflammation 18.90 (H) <5.00 mg/L   Erythrocyte sedimentation rate auto   Result Value Ref Range    Erythrocyte Sedimentation Rate 25 (H) 0 - 15 mm/hr   Hemoglobin A1c   Result Value  Ref Range    Estimated Average Glucose 298 (H) <117 mg/dL    Hemoglobin A1C 12.0 (H) <5.7 %   CBC with platelets and differential   Result Value Ref Range    WBC Count 7.4 4.0 - 11.0 10e3/uL    RBC Count 5.01 4.40 - 5.90 10e6/uL    Hemoglobin 15.0 13.3 - 17.7 g/dL    Hematocrit 44.4 40.0 - 53.0 %    MCV 89 78 - 100 fL    MCH 29.9 26.5 - 33.0 pg    MCHC 33.8 31.5 - 36.5 g/dL    RDW 14.1 10.0 - 15.0 %    Platelet Count 186 150 - 450 10e3/uL    % Neutrophils 79 %    % Lymphocytes 12 %    % Monocytes 7 %    % Eosinophils 1 %    % Basophils 1 %    % Immature Granulocytes 1 %    NRBCs per 100 WBC 0 <1 /100    Absolute Neutrophils 5.8 1.6 - 8.3 10e3/uL    Absolute Lymphocytes 0.9 0.8 - 5.3 10e3/uL    Absolute Monocytes 0.5 0.0 - 1.3 10e3/uL    Absolute Eosinophils 0.1 0.0 - 0.7 10e3/uL    Absolute Basophils 0.0 0.0 - 0.2 10e3/uL    Absolute Immature Granulocytes 0.1 <=0.4 10e3/uL    Absolute NRBCs 0.0 10e3/uL   Ketone Beta-Hydroxybutyrate Quantitative   Result Value Ref Range    Ketone (Beta-Hydroxybutyrate) Quantitative 7.16 (HH) <=0.30 mmol/L   CBC with platelets differential *Canceled*    Narrative    The following orders were created for panel order CBC with platelets differential.  Procedure                               Abnormality         Status                     ---------                               -----------         ------                     CBC with platelets and d...[[id number]]                                                   Please view results for these tests on the individual orders.   Phosphorus   Result Value Ref Range    Phosphorus 2.6 2.5 - 4.5 mg/dL   Magnesium   Result Value Ref Range    Magnesium 1.9 1.7 - 2.3 mg/dL   Hepatic panel   Result Value Ref Range    Protein Total 7.8 6.4 - 8.3 g/dL    Albumin 3.6 3.5 - 5.2 g/dL    Bilirubin Total 0.4 <=1.2 mg/dL    Alkaline Phosphatase 104 40 - 150 U/L    AST 28 0 - 45 U/L    ALT 49 0 - 70 U/L    Bilirubin Direct <0.20 0.00 - 0.30 mg/dL   Glucose by  meter   Result Value Ref Range    GLUCOSE BY METER POCT 286 (H) 70 - 99 mg/dL   UA with Microscopic reflex to Culture    Specimen: Urine, Midstream   Result Value Ref Range    Color Urine Light Yellow Colorless, Straw, Light Yellow, Yellow    Appearance Urine Clear Clear    Glucose Urine >=1000 (A) Negative mg/dL    Bilirubin Urine Negative Negative    Ketones Urine >150 (A) Negative mg/dL    Specific Gravity Urine 1.030 1.003 - 1.035    Blood Urine Negative Negative    pH Urine 5.5 5.0 - 7.0    Protein Albumin Urine 50 (A) Negative mg/dL    Urobilinogen Urine Normal Normal, 2.0 mg/dL    Nitrite Urine Negative Negative    Leukocyte Esterase Urine Negative Negative    Mucus Urine Present (A) None Seen /LPF    RBC Urine 2 <=2 /HPF    WBC Urine 5 <=5 /HPF    Squamous Epithelials Urine 1 <=1 /HPF    Hyaline Casts Urine 6 (H) <=2 /LPF    Narrative    Urine Culture not indicated   XR Abdomen 2 Views    Narrative    EXAM: XR ABDOMEN 2 VIEWS  LOCATION: [facility]  DATE: 10/14/2024    INDICATION: constipation  COMPARISON: None.      Impression    IMPRESSION: Moderate volume retained feces, correlate for constipation. No free air. Nonobstructive bowel gas pattern.   Blood gas venous   Result Value Ref Range    pH Venous 7.17 (LL) 7.32 - 7.43    pCO2 Venous 32 (L) 40 - 50 mm Hg    pO2 Venous 40 25 - 47 mm Hg    Bicarbonate Venous 12 (L) 21 - 28 mmol/L    Base Excess/Deficit Venous -15.5 (L) -3.0 - 3.0 mmol/L    FIO2 99     Oxyhemoglobin Venous 69 (L) 70 - 75 %    O2 Sat, Venous 70.9 70.0 - 75.0 %    Narrative    In healthy individuals, oxyhemoglobin (O2Hb) and oxygen saturation (SO2) are approximately equal. In the presence of dyshemoglobins, oxyhemoglobin can be considerably lower than oxygen saturation.   CBC with platelets differential *Canceled*    Narrative    The following orders were created for panel order CBC with platelets differential.  Procedure                                Abnormality         Status                     ---------                               -----------         ------                       Please view results for these tests on the individual orders.   Glucose by meter   Result Value Ref Range    GLUCOSE BY METER POCT 279 (H) 70 - 99 mg/dL   Basic metabolic panel   Result Value Ref Range    Sodium 138 135 - 145 mmol/L    Potassium 3.9 3.4 - 5.3 mmol/L    Chloride 108 (H) 98 - 107 mmol/L    Carbon Dioxide (CO2) 10 (LL) 22 - 29 mmol/L    Anion Gap 20 (H) 7 - 15 mmol/L    Urea Nitrogen 7.6 6.0 - 20.0 mg/dL    Creatinine 0.95 0.67 - 1.17 mg/dL    GFR Estimate >90 >60 mL/min/1.73m2    Calcium 7.5 (L) 8.8 - 10.4 mg/dL    Glucose 245 (H) 70 - 99 mg/dL   Ketone Beta-Hydroxybutyrate Quantitative   Result Value Ref Range    Ketone (Beta-Hydroxybutyrate) Quantitative 7.24 (HH) <=0.30 mmol/L   Osmolality   Result Value Ref Range    Osmolality Blood 311 (H) 275 - 295 mmol/kg    Narrative    Greater than 385 mmol/kg relates to stupor in hyperglycemia   Greater than 400 mmol/kg can relate to seizures   Greater than 420 mmol/kg can be lethal    Serum Osmalar Gap:   Normal <10   Larger suggest unmeasured substances present in serum (ethanol, methanol, isopropanol, mannitol, ethylene glycol).   Phosphorus   Result Value Ref Range    Phosphorus 2.3 (L) 2.5 - 4.5 mg/dL       Labs, vital signs, and imaging studies were reviewed by me.    Medications   potassium chloride 10 mEq in 100 mL sterile water infusion (10 mEq Intravenous $New Bag 10/14/24 2117)   insulin regular (MYXREDLIN) 1 unit/mL infusion (3.5 Units/hr Intravenous $New Bag 10/14/24 2049)   cephALEXin (KEFLEX) capsule 500 mg (500 mg Oral $Given 10/14/24 2132)   sulfamethoxazole-trimethoprim (BACTRIM DS) 800-160 MG per tablet 2 tablet (2 tablets Oral $Given 10/14/24 2132)   lidocaine 1 % 0.1-1 mL (has no administration in time range)   lidocaine (LMX4) cream (has no administration in time range)   sodium chloride (PF) 0.9%  PF flush 3 mL (3 mLs Intracatheter $Given 10/14/24 2119)   sodium chloride (PF) 0.9% PF flush 3 mL (has no administration in time range)   senna-docusate (SENOKOT-S/PERICOLACE) 8.6-50 MG per tablet 2 tablet (2 tablets Oral $Given 10/14/24 2133)   glucose gel 15-30 g (has no administration in time range)     Or   dextrose 50 % injection 25-50 mL (has no administration in time range)     Or   glucagon injection 1 mg (has no administration in time range)   0.45% sodium chloride + KCl 20 mEq/L infusion (has no administration in time range)   dextrose 5% and 0.45% NaCl + KCl 20 mEq/L infusion (has no administration in time range)   glatiramer (COPAXONE) 20 MG/ML injection 20 mg (has no administration in time range)   nystatin (MYCOSTATIN) suspension 500,000 Units (has no administration in time range)   nystatin (MYCOSTATIN) cream (has no administration in time range)   sodium chloride 0.9% BOLUS 1,000 mL (0 mLs Intravenous Stopped 10/14/24 1938)   sodium chloride 0.9% BOLUS 1,000 mL (0 mLs Intravenous Stopped 10/14/24 2055)       Assessments & Plan (with Medical Decision Making)   Alirio Nolan is a 38 year old male who presents to the emergency department with multiple complaints including increased thirst and urination, dry mouth, concern for yeast infection, constipation, left eye blurred vision.  Differential diagnosis includes diabetes, dehydration, MS flare, primary left eye pathology, electrolyte abnormality, underlying infectious process (UTI, etc.).  Labs, abdominal x-ray, urinalysis ordered to further evaluate the patient in the emergency department.    1600 patient was discussed with ophthalmology, they will come see the patient in the emergency department.    Laboratory workup is remarkable for white blood cell count within normal limits, ESR is elevated.  Accu-Chek 279.  Additionally, CMP is concerning for DKA with elevated anion gap at 24, low CO2 at 9, glucose 266.  Venous pH is low at 7.17, ketones are  elevated at 7.24.  Serum osmolality elevated at 311.  Urinalysis shows glucosuria, does not appear consistent with UTI    IV fluids and insulin gtt. ordered    Abdominal x-ray shows findings consistent with constipation, no evidence for obstruction    Patient was discussed with neurology, they agree with plan for ophthalmology consultation.  Would recommend holding off on MRI or other imaging unless ophthalmology feels it is warranted.  They would also hold off on any steroid treatment at this time as this is typically most beneficial in the acute phase of an MS flare and the patient would be an subacute phase due to his duration of symptoms.    Patient discussed with ophthalmology, as patient will be admitted, they will see him as a routine consult      Critical Care Addendum  My initial assessment, based on my interpretation of labs , established a high suspicion that Alirio Nolan has  diabetic ketoacidosis , which requires immediate intervention, and therefore he is critically ill.     After the initial assessment, the care team initiated multiple lab tests, initiated IV fluid administration, initiated medication therapy with (see MAR), and consulted with ophthalmology, neurology, internal medicine  to provide stabilization care. Due to the critical nature of this patient, I reassessed nursing observations, vital signs, physical exam, interpretation of labs and imaging, and mental status multiple times prior to his disposition.     Time also spent performing documentation, reviewing test results, discussion with consultants, and coordination of care.     Critical care time (excluding teaching time and procedures): 35 minutes.     X-ray images were personally reviewed by me, I agree with the radiology reads.    I have reviewed the nursing notes.    I have reviewed the findings, diagnosis, plan and need for follow up with the patient.    Patient and their further management were discussed with internal medicine,  to be admitted to their service. Plan was discussed with patient who understands and agrees with plan.    Current Discharge Medication List          Final diagnoses:   Diabetic ketoacidosis without coma associated with type 2 diabetes mellitus (H)       LYUBVO KAPOOR MD  10/14/2024   [facility] EMERGENCY DEPARTMENT       Lyubov Kapoor MD  10/14/24 0415

## 2024-10-14 NOTE — ED TRIAGE NOTES
"Pt here for a \"laundry list of issues\"    Stating his fiance has  yeast infection and he thinks he has yeast infection on penis that how now transferred to his tongue - reporting dry mouth.     Constipation no BM was over a week ago last one he has was a \"boulder\" causing a hemorrhoid pt continues to pass gas and feels like something wants to get out but cannot    He states he is eating half of what he normally would reporting stomach fullness that is causing nausea and now body weakness feels like his arms/legs are weighted down.    To the [facility] a week and a half ago vision went a little blurry.  No pain in the eye no contacts or glasses.  His family does have hx of diabetes.             "

## 2024-10-14 NOTE — ED TRIAGE NOTES
Triage Assessment (Adult)       Row Name 10/14/24 1436          Triage Assessment    Airway WDL WDL        Skin Circulation/Temperature WDL    Skin Circulation/Temperature WDL WDL        Peripheral/Neurovascular WDL    Peripheral Neurovascular WDL WDL        Cognitive/Neuro/Behavioral WDL    Cognitive/Neuro/Behavioral WDL WDL

## 2024-10-15 ENCOUNTER — DOCUMENTATION ONLY (OUTPATIENT)
Dept: MEDSURG UNIT | Facility: CLINIC | Age: 39
End: 2024-10-15

## 2024-10-15 ENCOUNTER — DOCUMENTATION ONLY (OUTPATIENT)
Dept: MEDSURG UNIT | Facility: CLINIC | Age: 39
End: 2024-10-15
Payer: COMMERCIAL

## 2024-10-15 LAB
ANION GAP SERPL CALCULATED.3IONS-SCNC: 15 MMOL/L (ref 7–15)
ANION GAP SERPL CALCULATED.3IONS-SCNC: 15 MMOL/L (ref 7–15)
ANION GAP SERPL CALCULATED.3IONS-SCNC: 16 MMOL/L (ref 7–15)
ANION GAP SERPL CALCULATED.3IONS-SCNC: 17 MMOL/L (ref 7–15)
ANION GAP SERPL CALCULATED.3IONS-SCNC: 19 MMOL/L (ref 7–15)
B-OH-BUTYR SERPL-SCNC: 2.42 MMOL/L
B-OH-BUTYR SERPL-SCNC: 2.63 MMOL/L
B-OH-BUTYR SERPL-SCNC: 2.65 MMOL/L
B-OH-BUTYR SERPL-SCNC: 2.95 MMOL/L
B-OH-BUTYR SERPL-SCNC: 5.47 MMOL/L
BUN SERPL-MCNC: 6.9 MG/DL (ref 6–20)
BUN SERPL-MCNC: 7.2 MG/DL (ref 6–20)
BUN SERPL-MCNC: 7.5 MG/DL (ref 6–20)
BUN SERPL-MCNC: 7.6 MG/DL (ref 6–20)
BUN SERPL-MCNC: 7.8 MG/DL (ref 6–20)
CALCIUM SERPL-MCNC: 8.5 MG/DL (ref 8.8–10.4)
CALCIUM SERPL-MCNC: 8.5 MG/DL (ref 8.8–10.4)
CALCIUM SERPL-MCNC: 8.6 MG/DL (ref 8.8–10.4)
CALCIUM SERPL-MCNC: 8.7 MG/DL (ref 8.8–10.4)
CALCIUM SERPL-MCNC: 8.7 MG/DL (ref 8.8–10.4)
CHLORIDE SERPL-SCNC: 103 MMOL/L (ref 98–107)
CHLORIDE SERPL-SCNC: 106 MMOL/L (ref 98–107)
CHLORIDE SERPL-SCNC: 106 MMOL/L (ref 98–107)
CHLORIDE SERPL-SCNC: 111 MMOL/L (ref 98–107)
CHLORIDE SERPL-SCNC: 112 MMOL/L (ref 98–107)
CREAT SERPL-MCNC: 0.62 MG/DL (ref 0.67–1.17)
CREAT SERPL-MCNC: 0.64 MG/DL (ref 0.67–1.17)
CREAT SERPL-MCNC: 0.67 MG/DL (ref 0.67–1.17)
CREAT SERPL-MCNC: 0.67 MG/DL (ref 0.67–1.17)
CREAT SERPL-MCNC: 0.69 MG/DL (ref 0.67–1.17)
CRP SERPL-MCNC: 29.42 MG/L
EGFRCR SERPLBLD CKD-EPI 2021: >90 ML/MIN/1.73M2
ERYTHROCYTE [DISTWIDTH] IN BLOOD BY AUTOMATED COUNT: 14.3 % (ref 10–15)
GLUCOSE BLDC GLUCOMTR-MCNC: 133 MG/DL (ref 70–99)
GLUCOSE BLDC GLUCOMTR-MCNC: 136 MG/DL (ref 70–99)
GLUCOSE BLDC GLUCOMTR-MCNC: 141 MG/DL (ref 70–99)
GLUCOSE BLDC GLUCOMTR-MCNC: 146 MG/DL (ref 70–99)
GLUCOSE BLDC GLUCOMTR-MCNC: 148 MG/DL (ref 70–99)
GLUCOSE BLDC GLUCOMTR-MCNC: 148 MG/DL (ref 70–99)
GLUCOSE BLDC GLUCOMTR-MCNC: 150 MG/DL (ref 70–99)
GLUCOSE BLDC GLUCOMTR-MCNC: 151 MG/DL (ref 70–99)
GLUCOSE BLDC GLUCOMTR-MCNC: 154 MG/DL (ref 70–99)
GLUCOSE BLDC GLUCOMTR-MCNC: 155 MG/DL (ref 70–99)
GLUCOSE BLDC GLUCOMTR-MCNC: 158 MG/DL (ref 70–99)
GLUCOSE BLDC GLUCOMTR-MCNC: 161 MG/DL (ref 70–99)
GLUCOSE BLDC GLUCOMTR-MCNC: 161 MG/DL (ref 70–99)
GLUCOSE BLDC GLUCOMTR-MCNC: 164 MG/DL (ref 70–99)
GLUCOSE BLDC GLUCOMTR-MCNC: 166 MG/DL (ref 70–99)
GLUCOSE BLDC GLUCOMTR-MCNC: 178 MG/DL (ref 70–99)
GLUCOSE BLDC GLUCOMTR-MCNC: 187 MG/DL (ref 70–99)
GLUCOSE BLDC GLUCOMTR-MCNC: 216 MG/DL (ref 70–99)
GLUCOSE BLDC GLUCOMTR-MCNC: 232 MG/DL (ref 70–99)
GLUCOSE BLDC GLUCOMTR-MCNC: 254 MG/DL (ref 70–99)
GLUCOSE BLDC GLUCOMTR-MCNC: 254 MG/DL (ref 70–99)
GLUCOSE BLDC GLUCOMTR-MCNC: 300 MG/DL (ref 70–99)
GLUCOSE SERPL-MCNC: 134 MG/DL (ref 70–99)
GLUCOSE SERPL-MCNC: 149 MG/DL (ref 70–99)
GLUCOSE SERPL-MCNC: 169 MG/DL (ref 70–99)
GLUCOSE SERPL-MCNC: 231 MG/DL (ref 70–99)
GLUCOSE SERPL-MCNC: 313 MG/DL (ref 70–99)
HCO3 SERPL-SCNC: 10 MMOL/L (ref 22–29)
HCO3 SERPL-SCNC: 11 MMOL/L (ref 22–29)
HCO3 SERPL-SCNC: 13 MMOL/L (ref 22–29)
HCO3 SERPL-SCNC: 14 MMOL/L (ref 22–29)
HCO3 SERPL-SCNC: 15 MMOL/L (ref 22–29)
HCT VFR BLD AUTO: 42.4 % (ref 40–53)
HGB BLD-MCNC: 13.8 G/DL (ref 13.3–17.7)
MAGNESIUM SERPL-MCNC: 1.9 MG/DL (ref 1.7–2.3)
MCH RBC QN AUTO: 28.6 PG (ref 26.5–33)
MCHC RBC AUTO-ENTMCNC: 32.5 G/DL (ref 31.5–36.5)
MCV RBC AUTO: 88 FL (ref 78–100)
PHOSPHATE SERPL-MCNC: 1.4 MG/DL (ref 2.5–4.5)
PHOSPHATE SERPL-MCNC: 1.7 MG/DL (ref 2.5–4.5)
PHOSPHATE SERPL-MCNC: 1.9 MG/DL (ref 2.5–4.5)
PHOSPHATE SERPL-MCNC: 2 MG/DL (ref 2.5–4.5)
PHOSPHATE SERPL-MCNC: 2.2 MG/DL (ref 2.5–4.5)
PLATELET # BLD AUTO: 181 10E3/UL (ref 150–450)
POTASSIUM SERPL-SCNC: 3.3 MMOL/L (ref 3.4–5.3)
POTASSIUM SERPL-SCNC: 3.5 MMOL/L (ref 3.4–5.3)
POTASSIUM SERPL-SCNC: 3.7 MMOL/L (ref 3.4–5.3)
POTASSIUM SERPL-SCNC: 3.7 MMOL/L (ref 3.4–5.3)
POTASSIUM SERPL-SCNC: 3.9 MMOL/L (ref 3.4–5.3)
POTASSIUM SERPL-SCNC: 4.8 MMOL/L (ref 3.4–5.3)
RBC # BLD AUTO: 4.82 10E6/UL (ref 4.4–5.9)
SODIUM SERPL-SCNC: 133 MMOL/L (ref 135–145)
SODIUM SERPL-SCNC: 135 MMOL/L (ref 135–145)
SODIUM SERPL-SCNC: 136 MMOL/L (ref 135–145)
SODIUM SERPL-SCNC: 137 MMOL/L (ref 135–145)
SODIUM SERPL-SCNC: 142 MMOL/L (ref 135–145)
WBC # BLD AUTO: 6.1 10E3/UL (ref 4–11)

## 2024-10-15 PROCEDURE — 86341 ISLET CELL ANTIBODY: CPT | Performed by: NURSE PRACTITIONER

## 2024-10-15 PROCEDURE — 85027 COMPLETE CBC AUTOMATED: CPT | Performed by: PHYSICIAN ASSISTANT

## 2024-10-15 PROCEDURE — 250N000011 HC RX IP 250 OP 636: Performed by: INTERNAL MEDICINE

## 2024-10-15 PROCEDURE — 99233 SBSQ HOSP IP/OBS HIGH 50: CPT | Performed by: INTERNAL MEDICINE

## 2024-10-15 PROCEDURE — 250N000009 HC RX 250: Performed by: PHYSICIAN ASSISTANT

## 2024-10-15 PROCEDURE — 36415 COLL VENOUS BLD VENIPUNCTURE: CPT | Performed by: PHYSICIAN ASSISTANT

## 2024-10-15 PROCEDURE — 250N000011 HC RX IP 250 OP 636: Performed by: PEDIATRICS

## 2024-10-15 PROCEDURE — 250N000012 HC RX MED GY IP 250 OP 636 PS 637: Performed by: NURSE PRACTITIONER

## 2024-10-15 PROCEDURE — 36416 COLLJ CAPILLARY BLOOD SPEC: CPT | Performed by: PHYSICIAN ASSISTANT

## 2024-10-15 PROCEDURE — 250N000013 HC RX MED GY IP 250 OP 250 PS 637: Performed by: EMERGENCY MEDICINE

## 2024-10-15 PROCEDURE — 99223 1ST HOSP IP/OBS HIGH 75: CPT | Performed by: NURSE PRACTITIONER

## 2024-10-15 PROCEDURE — 258N000003 HC RX IP 258 OP 636: Performed by: INTERNAL MEDICINE

## 2024-10-15 PROCEDURE — 36415 COLL VENOUS BLD VENIPUNCTURE: CPT | Performed by: NURSE PRACTITIONER

## 2024-10-15 PROCEDURE — 82010 KETONE BODYS QUAN: CPT | Performed by: PHYSICIAN ASSISTANT

## 2024-10-15 PROCEDURE — 84132 ASSAY OF SERUM POTASSIUM: CPT | Performed by: INTERNAL MEDICINE

## 2024-10-15 PROCEDURE — 99418 PROLNG IP/OBS E/M EA 15 MIN: CPT | Performed by: NURSE PRACTITIONER

## 2024-10-15 PROCEDURE — 250N000013 HC RX MED GY IP 250 OP 250 PS 637: Performed by: PHYSICIAN ASSISTANT

## 2024-10-15 PROCEDURE — 99222 1ST HOSP IP/OBS MODERATE 55: CPT | Mod: 4UV | Performed by: OPHTHALMOLOGY

## 2024-10-15 PROCEDURE — 36415 COLL VENOUS BLD VENIPUNCTURE: CPT | Performed by: INTERNAL MEDICINE

## 2024-10-15 PROCEDURE — 84100 ASSAY OF PHOSPHORUS: CPT | Performed by: PHYSICIAN ASSISTANT

## 2024-10-15 PROCEDURE — 80048 BASIC METABOLIC PNL TOTAL CA: CPT | Performed by: PHYSICIAN ASSISTANT

## 2024-10-15 PROCEDURE — 83735 ASSAY OF MAGNESIUM: CPT | Performed by: INTERNAL MEDICINE

## 2024-10-15 PROCEDURE — 120N000002 HC R&B MED SURG/OB UMMC

## 2024-10-15 PROCEDURE — 250N000013 HC RX MED GY IP 250 OP 250 PS 637: Performed by: INTERNAL MEDICINE

## 2024-10-15 PROCEDURE — 86140 C-REACTIVE PROTEIN: CPT | Performed by: PHYSICIAN ASSISTANT

## 2024-10-15 PROCEDURE — G0463 HOSPITAL OUTPT CLINIC VISIT: HCPCS

## 2024-10-15 RX ORDER — ESCITALOPRAM OXALATE 20 MG/1
20 TABLET ORAL DAILY
COMMUNITY
End: 2024-10-31

## 2024-10-15 RX ORDER — GLATIRAMER 40 MG/ML
40 INJECTION, SOLUTION SUBCUTANEOUS
COMMUNITY

## 2024-10-15 RX ORDER — POTASSIUM CHLORIDE 1500 MG/1
40 TABLET, EXTENDED RELEASE ORAL ONCE
Status: COMPLETED | OUTPATIENT
Start: 2024-10-15 | End: 2024-10-15

## 2024-10-15 RX ORDER — SODIUM CHLORIDE AND POTASSIUM CHLORIDE 150; 900 MG/100ML; MG/100ML
INJECTION, SOLUTION INTRAVENOUS CONTINUOUS
Status: DISCONTINUED | OUTPATIENT
Start: 2024-10-15 | End: 2024-10-17 | Stop reason: HOSPADM

## 2024-10-15 RX ADMIN — NYSTATIN: 100000 CREAM TOPICAL at 07:43

## 2024-10-15 RX ADMIN — NYSTATIN: 100000 CREAM TOPICAL at 21:24

## 2024-10-15 RX ADMIN — NYSTATIN 500000 UNITS: 100000 SUSPENSION ORAL at 21:23

## 2024-10-15 RX ADMIN — POTASSIUM & SODIUM PHOSPHATES POWDER PACK 280-160-250 MG 2 PACKET: 280-160-250 PACK at 14:40

## 2024-10-15 RX ADMIN — CEPHALEXIN 500 MG: 500 CAPSULE ORAL at 17:49

## 2024-10-15 RX ADMIN — INSULIN ASPART 11 UNITS: 100 INJECTION, SOLUTION INTRAVENOUS; SUBCUTANEOUS at 08:56

## 2024-10-15 RX ADMIN — NYSTATIN 500000 UNITS: 100000 SUSPENSION ORAL at 16:51

## 2024-10-15 RX ADMIN — NYSTATIN 500000 UNITS: 100000 SUSPENSION ORAL at 12:14

## 2024-10-15 RX ADMIN — MICONAZOLE NITRATE: 20 POWDER TOPICAL at 21:24

## 2024-10-15 RX ADMIN — MICONAZOLE NITRATE: 20 POWDER TOPICAL at 12:13

## 2024-10-15 RX ADMIN — POTASSIUM CHLORIDE AND SODIUM CHLORIDE 100 ML/HR: 900; 150 INJECTION, SOLUTION INTRAVENOUS at 15:18

## 2024-10-15 RX ADMIN — POTASSIUM & SODIUM PHOSPHATES POWDER PACK 280-160-250 MG 2 PACKET: 280-160-250 PACK at 18:41

## 2024-10-15 RX ADMIN — INSULIN HUMAN: 1 INJECTION, SOLUTION INTRAVENOUS at 07:42

## 2024-10-15 RX ADMIN — SODIUM CHLORIDE, POTASSIUM CHLORIDE, SODIUM LACTATE AND CALCIUM CHLORIDE 1000 ML: 600; 310; 30; 20 INJECTION, SOLUTION INTRAVENOUS at 15:18

## 2024-10-15 RX ADMIN — NYSTATIN 500000 UNITS: 100000 SUSPENSION ORAL at 07:42

## 2024-10-15 RX ADMIN — SENNOSIDES AND DOCUSATE SODIUM 2 TABLET: 8.6; 5 TABLET ORAL at 07:42

## 2024-10-15 RX ADMIN — SULFAMETHOXAZOLE AND TRIMETHOPRIM 2 TABLET: 800; 160 TABLET ORAL at 07:42

## 2024-10-15 RX ADMIN — CEPHALEXIN 500 MG: 500 CAPSULE ORAL at 06:03

## 2024-10-15 RX ADMIN — POTASSIUM CHLORIDE AND SODIUM CHLORIDE: 450; 150 INJECTION, SOLUTION INTRAVENOUS at 01:14

## 2024-10-15 RX ADMIN — INSULIN GLARGINE 40 UNITS: 100 INJECTION, SOLUTION SUBCUTANEOUS at 09:39

## 2024-10-15 RX ADMIN — POTASSIUM CHLORIDE 40 MEQ: 1500 TABLET, EXTENDED RELEASE ORAL at 17:09

## 2024-10-15 RX ADMIN — POTASSIUM & SODIUM PHOSPHATES POWDER PACK 280-160-250 MG 2 PACKET: 280-160-250 PACK at 21:23

## 2024-10-15 RX ADMIN — CEPHALEXIN 500 MG: 500 CAPSULE ORAL at 00:01

## 2024-10-15 RX ADMIN — POTASSIUM CHLORIDE, DEXTROSE MONOHYDRATE AND SODIUM CHLORIDE 100 ML/HR: 150; 5; 450 INJECTION, SOLUTION INTRAVENOUS at 08:25

## 2024-10-15 RX ADMIN — CEPHALEXIN 500 MG: 500 CAPSULE ORAL at 12:14

## 2024-10-15 RX ADMIN — SENNOSIDES AND DOCUSATE SODIUM 2 TABLET: 8.6; 5 TABLET ORAL at 21:23

## 2024-10-15 RX ADMIN — INSULIN GLARGINE 60 UNITS: 100 INJECTION, SOLUTION SUBCUTANEOUS at 21:22

## 2024-10-15 RX ADMIN — SULFAMETHOXAZOLE AND TRIMETHOPRIM 2 TABLET: 800; 160 TABLET ORAL at 21:24

## 2024-10-15 ASSESSMENT — ACTIVITIES OF DAILY LIVING (ADL)
ADLS_ACUITY_SCORE: 18

## 2024-10-15 NOTE — PROGRESS NOTES
"CLINICAL NUTRITION SERVICES  Reason for Assessment:  Nutrition education regarding--new DM dx and carb counting  PMH includes MS and morbid obesity admitted to [facility] after presenting to ED with polydipsia, polyuria, blurry vision, constipation and groin abscesses, medical work up reflects DKA and unknown underlying DM.   Diet History:  Pt notes he usually only ate 1 large meal/day at night.  Gave the example of spaghetti w/ red sauce as a typical meal.  The estimated carb load of his serving of pasta in a bowl is probably 90 g plus maybe 105+ from red sauce.    Nutrition Diagnosis:  Food- and nutrition-related knowledge deficit r/t no previous knowledge of diet recommendations for diabetes and carb counting.   Interventions:  Provided instruction on general sources of carbohydrates, carb counting, need to spread carb intake out across the day and reading food label nutrition fact panel. Encouraged focusing first on awareness of carb sources and portions and spreading intake across 3 meals and then adding in more changes as he is able.    Provided handouts : Carbohydrate Counting for People with Diabetes and Diabetes Label Reading Tips  Goals:   Patient will verbalize understanding of need to spread carbohydrate intake across the day and main sources of carb intake.   Return demonstration on food label reading.   Follow-up:    Patient to ask any further nutrition-related questions before discharge.  In addition, pt may request outpatient RD appointment.  Kisha Riddle RD, LD   6 & 8 Med/Surg RD  Mon-Fri Vocera: \"6 Med Surg Clinical Dietitian\" or \"8 Med Surg Clinical Dietitian\"  Weekend RD Vocera (Global): \"Weekend Holiday Clinical Dietitian\"        "

## 2024-10-15 NOTE — PROGRESS NOTES
[facility]    Medicine Progress Note - Hospitalist Service, GOLD TEAM 19    Date of Admission:  10/14/2024    Assessment & Plan   Mr. Alirio Nolan is a pleasant 39 yo male with hx of multiple sclerosis and morbid obesity admittiled to [facility] on 10/14 after presenting to the ED with polydipsia, polyuria, blurry vision, constipation and groin boils.  Workup revealed new diagnosis T2DM with DKA.      New diagnosis of DM in DKA  ---   Has a FH of diabetes  ---   Presented to the ED with ~ 1 wk h/o polydipsia, polyuria, blurry vision (mainly L eye) and fatigue.   ---   Initial glucose was 286, CO3 9, AG 24, pH 7.17, serum ketones 7.16, and Hgba1c was 12.0% w/ EAG of 298  ---   Diagnosed w/ T2DM w/ DKA  ---   Continue Insulin drip and lytes replacement protocol  ---   Change IVF to NS + 20 meq Kcl at 100 ml/h  ---   LR one liter bolus now  ---   Will keep patient NPO until resolution of DKA  ---   Patient is still in DKA when seen this morning  ---   Endocrine service consulted    Left eye blurry vision  ---   Likely due to hyperglycemia  ---   Pt has underlying MS thus ophthalmology service consulted for further evaluation  ---   Evaluated by Ophthalmology this am and their assessment is that pt's blurry vision is due to hyperglycemia-induced lens swelling in setting of DKA  ---   Continue insulin drip and IVF for treatment of DKA  ---   Ophthalmology will reexamine patient on 10/16     Oral and penile erythema and discomfort  Perineal abscesses  ---   Sxs present for the past 1.5 weeks.   ---   States wife has yeast infection, of which similar sxs developed on his penis, and now feels tongue discomfort with dry mouth.   ---   Also with painful boils in groin regon that have already opened up and draining. Denies treatment.   ---   Denies fever and chills.   ---   WBC WNL. Initial CRP 18.9.   ---   Continue Keflex and Bactrim started in ED  ---   Start oral and topical  nystatin  ---   WOCN consulted; appreciate rec's     Acute constipation  ---   LBM ~ 1 week ago, of which he states it was hard and difficult to pass.   ---   Denied prior hx of constipation.   ---   He is passing gas.   ---   Abs xray with moderate stool burden otherwise unremarkable.   ---   Abd exam also unremarkable.   ---   Continue Miralax and Senokot-S     Multiple sclerosis  ---   Diagnosed ~ 10 yrs ago after developing BLE paresthesia  ---   Chronically managed on Copaxone 3x weekly, continue      Hypophosphatemia  ---   Start replacement protocol          Diet: Combination Diet Regular Diet Adult; Moderate Consistent Carb (60 g CHO per Meal) Diet    DVT Prophylaxis: Pneumatic Compression Devices  Gray Catheter: Not present  Lines: None     Cardiac Monitoring: None  Code Status: Full Code          Diet: NPO for Medical/Clinical Reasons Except for: Meds, Ice Chips    DVT Prophylaxis: 6  Gray Catheter: Not present  Lines: None     Cardiac Monitoring: None  Code Status: Full Code    Disposition Plan   Medically Ready for Discharge: Anticipated Tomorrow             Polina Hoffman MD  Hospitalist Service, GOLD TEAM [address] [place] [address]  Securely message with Backtrace I/O (more info)  Text page via [facility] Paging/Directory   See signed in provider for up to date coverage information  ______________________________________________________________________    Interval History   Still in DKA  Ate breakfast before I saw him  No complaints but feels tired  Uneventful night    Physical Exam   Vital Signs: Temp: 98  F (36.7  C) Temp src: Oral BP: (!) 153/74 Pulse: 85   Resp: 17 SpO2: 97 % O2 Device: None (Room air)    Weight: 385 lbs 4.8 oz  General: Morbidly obese, aao x 3, NAD.  HEENT:  NC/AT, PERRL, EOMI, neck supple, no thyromegaly, op clear, mmm.  CVS:  NL s 1 and s2, no m/r/g.  Lungs:  CTA B/L.   Abd:  Soft, + bs, NT, no rebound or gaurding, no fluid shift.  Ext:  No c/c.  Lymph:   No edema.  Neuro:  Nonfocal.  Musculoskeletal: No calf tenderness to palpation.    Skin:  No rash.  Psychiatry:  Mood and affect appropriate.      Data     I have personally reviewed the following data over the past 24 hrs:    6.1  \   13.8   / 181     133 (L) 103 7.2 /  169 (H)   3.3 (L) 15 (L) 0.69 \     ALT: 48; 49 AST: 28; 28 AP: 105; 104 TBILI: 0.5; 0.4   ALB: 3.8; 3.6 TOT PROTEIN: 7.6; 7.8 LIPASE: N/A     TSH: 1.75 T4: N/A A1C: 12.0 (H)     Procal: N/A CRP: 29.42 (H) Lactic Acid: N/A         Imaging results reviewed over the past 24 hrs:   Recent Results (from the past 24 hour(s))   XR Abdomen 2 Views    Narrative    EXAM: XR ABDOMEN 2 VIEWS  LOCATION: [facility]  DATE: 10/14/2024    INDICATION: constipation  COMPARISON: None.      Impression    IMPRESSION: Moderate volume retained feces, correlate for constipation. No free air. Nonobstructive bowel gas pattern.

## 2024-10-15 NOTE — PLAN OF CARE
Problem: Diabetes  Goal: Blood Glucose Level Within Target Range  10/15/2024 1044 by Srinivasan Rodriguez, RN  Outcome: Not Progressing  10/15/2024 1044 by Srinivasan Rodriguez, RN  Outcome: Not Progressing     Problem: Skin or Soft Tissue Infection  Goal: Absence of Infection Signs and Symptoms  Outcome: Not Progressing      VS:  Temp: 98.8  F (37.1  C) Temp src: Oral BP: 113/63 Pulse: 78   Resp: 17 SpO2: 96 % O2 Device: None (Room air)     O2: SpO2 > 96 and stable on RA. Diminished L/S. Denies chest pain and SOB.    Output: Voids spontaneously without difficulty to bathroom    Last BM: 10/15/2024, denies abdominal discomfort. BS active / passing flatus.    Activity:  independent   Skin:  Boil in between thighs    Pain:  0/10    CMS: Intact, AOx4.   Blurry vision   Dressing:  ABD dressing in between legs    Diet: Combination regular, mo consistent carb. Denies nausea/vomiting.    LDA: R PIV SL / infusing insulin drip, infusing dextrose 5% and 0.45 NaCl plus 20 meqs KCL 100ml/hr   L PIV SL   Equipment: IV pole, personal belongings,    Plan: Standard precautions maintained / Continue with plan of care. Call light within reach, pt able to make needs known.   Plan: insulin drip, on tele   Additional Info: On RN managed electrolytes   0755 coordinated the fluid rate with pharmacist and Dr SCHWARTZ  1036 coordinated the fluids with Dr SCHWARTZ  MD put him on NPO during lunch time

## 2024-10-15 NOTE — PROGRESS NOTES
COPAY CARD OBTAINED    Medication: ONETOUCH VERIO REFLECT W/DEVICE KIT  Sponsor: [employer]  Member ID: [id number]  BIN: [id number]  PCN: [id number]  Group: [id number]  Expected Copay: $ 0  Copay card Monthly Max Amount: $ 27.60  Copay card Annual Amount: $ 27.60  Comments: Voucher for free meter.      Sherry Ramirez [facility]  Discharge Pharmacy Liaison  [facility] Discharge Pharmacy  Pronouns: She/Her/Hers    Securely message with [employer], Epic Secure Chat, or [employer]  Phone: [phone]  Fax: [phone]  [email or web address]

## 2024-10-15 NOTE — PHARMACY-ADMISSION MEDICATION HISTORY
Pharmacist Admission Medication History    Admission medication history is complete. The information provided in this note is only as accurate as the sources available at the time of the update.    Information Source(s): Patient via in-person    Pertinent Information:    - Patient is a good historian. Able to tell me medication names/doses without prompting   - Glatiramer: patient states he last took ~2 weeks ago d/t issues with insurance. PLC placed.     Changes made to PTA medication list:  Added: escitalopram - not in fill history, but is in Care Everywhere visit 10/8/24    Deleted: None  Changed:   Glatiramer 20 mg daily -> 40 mg MWF     Allergies reviewed with patient and updates made in EHR: yes    Medication History Completed By: RICK CERNA RPH 10/15/2024 2:17 PM    PTA Med List   Medication Sig Note Last Dose    escitalopram (LEXAPRO) 20 MG tablet Take 20 mg by mouth daily.  10/13/2024    glatiramer acetate 40 MG/ML injection Inject 40 mg subcutaneously Every Mon, Wed, Fri Morning. 10/15/2024: Ran out, having insurance issues  Past Month

## 2024-10-15 NOTE — CONSULTS
Consulted to run a test claim for Glatiramer.    Patient has pharmacy benefits through [employer]. Per insurance, the following requires prior authorization:     Glatiramer 40mg/ml syringes  This process has been started--please see separate notes linked from Prior Authorization Encounter for details/updates regarding this PA.      Please feel free to contact me with any other test claims, prior authorizations, or insurance questions regarding outpatient medications.     Thanks!      Sherry Ramirez [facility]  Discharge Pharmacy Liaison  [facility] Discharge Pharmacy  Pronouns: She/Her/Hers    Securely message with [employer], Epic Secure Chat, or [employer]  Phone: [phone]  Fax: [phone]  [email or web address]

## 2024-10-15 NOTE — CONSULTS
Consulted to run a test claim for Glucose Meter/Supplies, Insulin Glargine, RApid Analog, Glucagon, & CGM.    Patient has pharmacy benefits through [employer]. Per insurance, the following are covered and preferred under the patient's plans:     OneTouch strips/lancets - $13/$2 (free meter w/voucher)  Freestyle strips/lancets - $20/$1 (free meter w/voucher)  Semglee pens/vials - $5  Insulin Lispro pens/vials - $10  Lyumjev pens/vials - $25  Glucagon kit - $36  Gvoke - $23  Zegalogue - $92, $35 w/copay card     The following require prior authorization:  Dexcom CGM  Freestyle Andrew CGM    The following are not covered:  Accu-chek meter/supplies  Contour meter/supplies  Lantus  Basaglar  Insulin Aspart  Novolog  Fiasp  Insulin Lispro  Humalog  Admelog  Baqsimi      Please feel free to contact me with any other test claims, prior authorizations, or insurance questions regarding outpatient medications.     Thanks!      Sherry Ramirez CPhT  Discharge Pharmacy Liaison  [facility] Discharge Pharmacy  Pronouns: She/Her/Hers    Securely message with [employer], Epic Secure Chat, or [employer]  Phone: [phone]  Fax: [phone]  [email or web address]

## 2024-10-15 NOTE — CONSULTS
"  Inpatient Diabetes Management Service : New Consult Note     Patient: Alirio Nolan   YOB: 1985    MRN: [id number]     Date of Consult : 10/15/2024   Date of Admission: 10/14/2024     Reason for Consult: \"New Diagnosis of DM   Consult Requestor: Demond Anaya PA-C           History of Present Illness:     HPI:  Mr. Alirio Nolan is a pleasant 37 yo male with hx of multiple sclerosis and morbid obesity admitted to [facility] after presenting to ED with polydipsia, polyuria, blurry vision, constipation and groin abscesses, medical work up reflects DKA and unknown underlying DM.     IDS consulted to assist with glycemic management and optimization while inpatient and when applicable, recommendations for transition home.    History obtained via the patient, chart review and discussion with primary team.     Additional Historian:  none    Patient is not known to the Inpatient Diabetes Service from past admission(s)     Leading up to this admission, the patient reports he was his normal self until about 2-3 weeks ago, began to have excessive thirst and urination which escalated along with vision changes and constipation as noted above all which prompted him to come to the ER when he was found to have DKA with glucose of 266, AG 24, bicarb 9, ketones of 7.16, acidotic with VBG of 7.17.     Osmolality 311, Phos 2.3    A1c 12.0%,     Since admission, he has been started on IV insulin drip.   He has not been told in the past he has DM or that he has pre-DM.     Currently, patient is on 60 g cho diet  ELOS: > 2 days     BG at time of consult:  286 mg/dL    Other Active/Contributory Medical Problems impacting glycemic control: DKA   Planned Procedures/Surgeries which could require NPO: no    DM specific Labs on Admission if contributory, otherwise see labs below:    Presenting Glucose: 286  BMP:  Bicarb (9), Anion Gap (24)   BhB: 7.17   Osmolality 311  Hgb: 15.0   A1c: 12.0%     GAD65 antibody, islet " antibody, insulin antibody, and c-peptide not found on epic search today.      Inpatient Glucose Trends:      While on IV insulin., requiring a range of 4-14 unit(s) on IV insulin drip.                          Diabetes History:   Diabetes Type and Duration: New diagnosis - A1c 10/14/2024: 12.0%    PTA Medication Regimen: n/a  Historical Diabetes Medications:     11/21/2018:  4.8    Glucose monitoring device and frequency: n/a  Outpatient Diabetes Provider: n/a  Formal Diabetes Education/Educator: n/a  ------------------------  Complications:    retinopathy  unk  last dilated eye exam: today 10/15/2024  Peripheral neuropathy:  unk   Nephropathy:  unk  Microalbuminuria: unk  Known autonomic neuropathy: un, specifically gastroparesis:  unk, gastric-emptying test: unk  Dizziness/fainting when standing: no  Neurogenic bladder: no      Last A1c: 12.0%   Anemic at time of last A1c: No   RBC transfusion in past 3 months:  none      History of DKA: no    Hypoglycemia PTA: n/a  - Frequency: n/a  - Severity: n/a  - Awareness: unk   Safety Kit:   - Glucagon: n/a   - Ketone Strips: n/a   - Glucose tabs/food: n/a  Medic Alert if Type 1: n/a    Diet/Lifestyle: typically eats x1 meal per day - dinner   Often [about every other day] will eat [employer] and have large fri with 1-2 McChicken and regular soda.    Will sometimes snack on fruit      Exercise: will walk about 15 minutes every 2 hours [has sedentary job so gets up to move around]  Recent reported weight change:  no  Ability to [place] Prescribed Regimen:  TBD   Food/Housing Insecurity: no          Medications Impacting Glycemia:    Steroids: none  D5W containing solutions/medications: yes - has D5W at 100 ml/hr or 5 g cho per hour   Other medications/factors impacting glucose:  glucose toxicity, insulin resistance          Diet/Nutrition:    Orders Placed This Encounter      Combination Diet Regular Diet Adult; Moderate Consistent Carb (60 g CHO per Meal)  "Diet  Supplements:  none  TF: no  TPN: no          Review of Systems:    CC: feeling much better, no longer urinating every 15 minutes, abd cramping gone  Constitutional: no fever and chills. no recent weight gain or loss.    HEENT: no headache, vision changes, hearing changes, sinus congestion, and swollen glands.   Cardiac: no chest pain, palpitations, or racing heart.    Respiratory: no dyspnea on exertion and at rest. no wheezing, cough with no active sputum production.    GI: no abdominal pain, tenderness and bloating. no nausea and vomiting. no changes in stool pattern, constipation now resolved and diarrhea.    : no difficulty urinating, dysuria, and incontinence.    MSK/Integumentary. no swelling/edema. no weakness. no new rashes, wounds, and bruising.    Endocrine: improved polyuria, polydipsia           Past Medical History:       Past Medical History:   Diagnosis Date    MS (multiple sclerosis) (H)              Past Surgical History:    History reviewed. No pertinent surgical history.          Social History:      Social History     Tobacco Use    Smoking status: Every Day    Smokeless tobacco: Never   Substance Use Topics    Alcohol use: No        History   Sexual Activity    Sexual activity: Not on file      Tobacco: as above   Etoh: as above/not currently     Other Substance: not currently    Marital Status: single - engaged    Lives in [place]          Family History:    Reviewed.    Family History of Diabetes: maternal grandfather with DM     No family history on file.          Physical Exam:    BP (!) 158/79 (BP Location: Left arm)   Pulse 77   Temp 98.3  F (36.8  C) (Oral)   Resp 16   Ht 1.778 m (5' 10\")   Wt (!) 174.8 kg (385 lb 4.8 oz)   SpO2 99%   BMI 55.28 kg/m     General:  stable and well appearing, in no acute distress.  HEENT:  NC/AT. MMM, sclera not injected, hearing intact  Lungs:  unremarkable, no new cough, no SOB  ABD:  rounded  Skin:  warm and dry, no obvious " lesions  Feet:   warm CMS intact    MSK:   moving all extremities  Lymp:   no LE edema  Mental Status:  Alert and oriented x3  Psych:   Cooperative, good eye contact, full/appropriate affect         Laboratory Data:      Lab Results   Component Value Date    A1C 12.0 (H) 10/14/2024     Recent Labs   Lab Test 10/14/24  1633   WBC 7.4   RBC 5.01   HGB 15.0   HCT 44.4   MCV 89   MCH 29.9   MCHC 33.8   RDW 14.1        Recent Labs   Lab Test 10/15/24  0603 10/15/24  0524 10/15/24  0229 10/15/24  0146 10/14/24  2246 10/14/24  2107   NA  --   --   --  135  --  138   POTASSIUM  --   --   --  3.7  --  3.9   CHLORIDE  --   --   --  106  --  108*   CO2  --   --   --  10*  --  10*   ANIONGAP  --   --   --  19*  --  20*   * 166*   < > 313*   < > 245*   BUN  --   --   --  7.8  --  7.6   CR  --   --   --  0.62*  --  0.95   ABDIRASHID  --   --   --  8.5*  --  7.5*    < > = values in this interval not displayed.     Recent Labs   Lab Test 10/14/24  1633   PROTTOTAL 7.8  7.6   ALBUMIN 3.6  3.8   BILITOTAL 0.4  0.5   ALKPHOS 104  105   AST 28  28   ALT 49  48            Assessment and Recommendations:       Assessment:     New onset Diabetes Mellitus presenting in DKA with glucose of 266, AG 24, bicarb 9, ketones of 7.16, acidotic with VBG of 7.17, Osmolality 311, Phos 2.3.  c/b skin/groin abscesses and insulin resistance.  A1c 12.0%  Acute hyperglycemia; improving  Constipation; resolved  MS; chronic/stable  BMI:  55       Plan/Recommendations:  Please notify Inpatient Diabetes Service if changes are planned to steroids, nutrition, TPN/TF and anticipated procedures requiring prolonged NPO status.     - IV insulin drip, DKA protocol   - Lantus 40 unit(s) q 24 hrs at 0900 - decrease to Alg 2 and maintain  - Lantus TBD based on trends from the dose this AM with goal working toward transition off drip this evening if appropriate    - addendum: add Lantus 60 unit(s) at 2000 and maintain drip overnight   - Novolog Meal  Coverage: 1 units per 5 g CHO, TID AC and PRN with snacks/supplements              - will further increase to 1:3 g cho starting at noon   - BG monitoring: Every hour on insulin drip per IV insulin protocol    - Will add DM labs - GERI ab, islet ab - will take days to return - will not change tx.  - Hypoglycemia protocol    - Carb counting protocol recommended  - IDS to continue to follow.  Please do not hesitate to contact the team with questions/concerns 0700 - 1700 hrs.    Discussion:      Patient presented to [facility] and found to have DKA, meeting all criteria for diagnosis at time of admission [ , pH on VBG 7.17, Bicarb 9].      Appropriately started on IV insulin, DKA protocol resulting in a rapid de-escalation in BG from 286 mg/dL to 154 mg/dL.   Will recommend to maintain BG at/near 180 - 200 mg/dL (unless contraindicated) until ketosis resolved, then titrate to target.   Should ketosis resolution be prolonged, could adjust to LR/Plasmalyte to assist.      Fluid resuscitation initiated with fluids given per protocol, tachycardia resolved, objective evidence he has been fully fluid resuscitated and fluids remain on per DKA protocol at this time.  Fluids ordered per protocol and currently is on dextrose at 100 ml/hr. IS NOT NPO so did add ICR novolog.     Recommend to change dextrose to NS since he is eating until AG fully normalizes. Trending well.       Focus this admission to be on ID of triggers to DKA and prevention and attempts to ameliorate these as much as able/reasonable.     Will not be appropriate for SGLT-2 at this time - groin abscesses - may be in future.     Discussed GLP1 with patient -  Denies history of pancreatitis, MEN syndrome (multiple endocrine neoplasia syndromes - tumors in the parathyroid gland, pituitary gland, or islet cells of the pancreas), current excessive ETOH use/abuse, or history of gastroparesis.  Consider GLP1 in near future - but not in presence of acute DKA  presentation.    Will add on GERI ab and Islet ab since acute and new onset in DKA, however is likely to be T2DM but for completeness testing will be done and will be started solely on insulin as he is >10% A1c per ADA recommendations.     This has been discussed and he verbalizes understanding it could change in the future.     Spent > 45 minutes face-to-face    5:57 PM  Continues to require 6-10 unit(s) per hour with 40 unit(s) of lantus in the background.  Will add lantus 60 unit(s) to further assist with significant insulin resistance and maintain drip and protocol overnight.     Diabetes educator did meet with patient, did well - see note from GAGANDEEP De.       Tentative Discharge Planning - to be finalized at/near day of discharge:    Medications: TBD    Test Claims: completed on 10/15, see pharm liaison notes for details    Education: Ordered on 10/14.  Final needs to be assessed closer to discharge.    Outpatient Follow-up: recommend to establish [facility]      Thank you for this consult. IDS will continue to follow.        AUBREY Matta-CNP, BC-ADM   Inpatient Diabetes Service  Available on AgentPair - when on duty.     To contact Inpatient Diabetes Service:  7 AM - 5 PM: Page the IDS MARIANNA following the patient that day (see filed or incomplete progress notes/consult notes under Endocrinology)    OR if uncertain of provider assignment: page job code [phone]    5 PM - 7 AM: First call after hours is to primary service.    For urgent after-hours questions, page job code for on call fellow: [phone]     I spent a total of 110 minutes on the date of the encounter doing prep/post-work, chart review, history and exam, documentation and further activities per the note including lab review, multidisciplinary communication, counseling the patient and/or coordinating care regarding acute hyper/hypoglycemic management, as well as discharge management and planning/communication.  See note  for details.      Please notify Inpatient Diabetes Service if changes are planned to steroids, nutrition, TPN/TF and anticipated procedures requiring prolonged NPO status.

## 2024-10-15 NOTE — CONSULTS
[facility]  WOC Nurse Inpatient Assessment     Consulted for: bilateral groin, large open sore with clear discharge     Summary: pt states boil type wound appeared ~2 weeks ago and has had some in the distant past     Patient History (according to provider note(s):      Mr. Alirio Nolan is a pleasant 37 yo male with hx of multiple sclerosis and morbid obesity admittiled to [facility] after presenting to ED with polydipsia, polyuria, blurry vision, constipation and groin boils, of which workup revealed new diagnosis of DM in DKA.     Assessment:      Areas visualized during today's visit: Focused: bilateral groin     Skin Injury Location: bilateral groin    Right    Left   Last photo: 10/15  Skin injury due to: Fungal rash  and boils   Skin history and plan of care:   see above   Affected area:      Skin assessment: Erosion of epidermis, Rash, and dermis      Measurements (length x width x depth, in cm) right 0.8  x 1  x  0.2 cm & left 1.5 x 0.5 x 0.2 cm      Color: red     Temperature  normal      Drainage: moderate .      Color: serosanguinous and clear      Odor: none  Pain: no grimacing or signs of discomfort, none  Pain interventions prior to dressing change: no significant pain present   Treatment goal: Drainage control and Infection control/prevention  STATUS: initial assessment  Supplies ordered: at bedside     Treatment Plan:     Bilateral groin wound(s): BID and PRN cleanse with soap and water or wound cleanser and pat dry. Apply barrier paste to open or scabbed wounds. If moderate drainage okay to tuck ABD to absorb drainage.      Orders: Written    RECOMMEND PRIMARY TEAM ORDER: Antifungal groin  Education provided: plan of care and wound progress  Discussed plan of care with: Patient, Nurse, and Physician  WOC nurse follow-up plan: weekly  Notify WOC if wound(s) deteriorate.  Nursing to notify the Provider(s) and re-consult the WOC Nurse if new skin  concern.    DATA:     Current support surface: Standard  Standard gel mattress (Isoflex)  Containment of urine/stool: Incontinent pad in bed  BMI: Body mass index is 55.28 kg/m .   Active diet order: Orders Placed This Encounter      Combination Diet Regular Diet Adult; Moderate Consistent Carb (60 g CHO per Meal) Diet     Output: No intake/output data recorded.     Labs:   Recent Labs   Lab 10/15/24  0654 10/14/24  1633   ALBUMIN  --  3.6  3.8   HGB 13.8 15.0   WBC 6.1 7.4   A1C  --  12.0*     Pressure injury risk assessment:   Sensory Perception: 4-->no impairment  Moisture: 3-->occasionally moist  Activity: 3-->walks occasionally  Mobility: 3-->slightly limited  Nutrition: 3-->adequate  Friction and Shear: 2-->potential problem  Chacorta Score: 18    Vicky Cunningham RN CWOCN  Pager no longer is use, please contact through [employer] group: [facility]  Dept. Office Number: [phone]

## 2024-10-15 NOTE — PROGRESS NOTES
Prior Authorization **APPROVED**    Authorization Effective Date: 10/15/2024  Authorization Expiration Date: 10/15/2025  Medication: DEXCOM G7 SENSOR MISC  Approved Dose/Quantity: -  Reference #: CoverMyMeds Key: [id number] - PA Case ID #: [id number]   Insurance Company: [employer] - Phone [phone] Fax [phone]  Expected CoPay: $ 56.42    CoPay Card Available: No    Foundation Assistance Needed: -  Which Pharmacy is filling the prescription (Not needed for infusion/clinic administered): n/a - Patient has not yet been discharged, and there are no outpatient orders for this medication yet  Comments:  Proactive Prior Authorization. Approval good for  & sensors.  is $46.14 for one fill.            Sherry Ramirez [facility]  Discharge Pharmacy Liaison  [facility]  Pronouns: She/Her/Hers    Securely message with [employer] Newsletter, Epic Secure Chat, or [employer]  Phone: [phone]  Fax: [phone]  [email or web address]

## 2024-10-15 NOTE — CONSULTS
Consulted to run a test claim for Glucose Meter/Supplies, Insulin Glargine, RApid Analog, Glucagon, & CGM.    Patient has pharmacy benefits through [employer]. Per insurance, the following are covered and preferred under the patient's plans:     OneTouch strips/lancets - $13/$2 (free meter w/voucher)  Freestyle strips/lancets - $20/$1 (free meter w/voucher)  Semglee pens/vials - $5  Tresiba pens/vials - $25  Insulin Lispro pens/vials - $10  Lyumjev pens/vials - $25  Metformin IR tabs - $2  Metformin ER tabs - $3  Byetta - $25  Rybelsus - $40, $15 w/copay card  Bydureon BCISE - $40, $0 w/copay card  Mounjaro - $40, $25 w/copay card  Ozempic - $40, $25 w/copay card  Trulicity - $40, $25 w/copay card  Glucagon kit - $36  Gvoke - $23  Zegalogue - $92, $35 w/copay card     The following require prior authorization:  Dexcom G7  This process has been started--please see separate notes linked from Prior Authorization Encounter for details/updates regarding this PA.  Bydureon BCISE  Freestyle Andrew CGM    The following are not covered:  Accu-chek meter/supplies  Contour meter/supplies  Lantus  Basaglar  Insulin Aspart  Novolog  Fiasp  Insulin Lispro  Humalog  Admelog  Metformin ER *osmotic* tabs  Victoza  Baqsimi      Please feel free to contact me with any other test claims, prior authorizations, or insurance questions regarding outpatient medications.     Thanks!      Sherry Ramirez CPhT  Discharge Pharmacy Liaison  [facility] Discharge Pharmacy  Pronouns: She/Her/Hers    Securely message with Vocera, Epic Secure Chat, or [employer]  Phone: [phone]  Fax: [phone]  [email or web address]

## 2024-10-15 NOTE — PLAN OF CARE
"Goal Outcome Evaluation:         VS: BP (!) 158/79 (BP Location: Left arm)   Pulse 77   Temp 98.3  F (36.8  C) (Oral)   Resp 16   Ht 1.778 m (5' 10\")   Wt (!) 174.8 kg (385 lb 4.8 oz)   SpO2 99%   BMI 55.28 kg/m     O2: RA   Output: Voids spontaneously   Last BM: NA   Activity: Independent   Skin: WOC Consult placed for groin sores, right and left groin   Pain: Denies   CMS: Intact, AOx4.   Dressing: None   Diet: Diabetic diet, DKA   LDA: PIV R arm   Equipment: IV pole, personal belongings,    Plan: Continue POC   Additional Info:                   "

## 2024-10-15 NOTE — H&P
[facility]    History and Physical - Hospitalist Service, GOLD TEAM        Date of Admission:  10/14/2024    Assessment & Plan   Mr. Alirio Nolan is a pleasant 37 yo male with hx of multiple sclerosis and morbid obesity admittiled to [facility] after presenting to ED with polydipsia, polyuria, blurry vision, constipation and groin boils, of which workup revealed new diagnosis of DM in DKA.     # New diagnosis of DM in DKA  # Polyuria, polydipsia, and blurry vision  Has FH of DM but denies personal history. Mainly came to ED with ~ 1 wk h/o polydipsia, polyuria, blurry vision (mainly L eye), fatigue and below soft tissue concerns. Workup revealed new diagnosis of DM in DKA with non-fasting BGs persistently high 200s, bicarb 9, AG 24, ph 7.17, serum ketones 7.16, and A1C 12.0. Pt otherwise HDS and awake, alert and oriented in all spheres. Pt denies hx of vision concerns including need for glasses/contacts, but states over the past week has had intermittent blurry vision, mainly in L eye, but denies other abnormal eye sxs including pain, photophobia, etc. ED provider d/w Neurology given pt's hx of MS, but no need for further eval or treatment with regards to possible MS flare, but did agree with ophthalmolgy consult.   - Endocrinology and ophthalmology consulted and appreciate recommendations.   - In interim, continue insulin drip, glucose checks, IVFs and lab draws per DKA order set.   - K, Mg, and phos RN driven replacement protocols ordered  - DM educator, RD also consulted.   - Hypoglycemic protocol   - Mod consistent diet    # Oral and penile erythema and discomfort  # Perineal abscesses  Sxs present for the past 1.5 weeks. States wife has yeast infection, of which similar sxs developed on his penis, and now feels tongue discomfort with dry mouth. Also with painful boils in groin regon that have already opened up and draining. Denies treatment. Denies fever and chills.  WBC WNL. Initial CRP 18.9.   - Continue Keflex and Bactrim started in ED  - Start oral and topical nystatin  - WOCN consulted; appreciate rec's    # Acute constipation: LBM ~ 1 week ago, of which he states it was hard and difficult to pass. Denies prior hx of constipation. Is passing gas. Denies treatment. Denies assoc sxs including abd pain and nausea. AXR here with moderate stool burden otherwise unremarkable. Abd exam also unremarkable.   - Start Senokot-S two tabs BID.     # Multiple sclerosis: Diagnosed ~ 10 yrs ago after developing BLE paresthesia, now absent chronically managed on Copaxone. Denies other chronic neurologic deficits.   - Continue PTA 3x weekly Copaxone        Diet: Combination Diet Regular Diet Adult; Moderate Consistent Carb (60 g CHO per Meal) Diet    DVT Prophylaxis: Pneumatic Compression Devices  Gray Catheter: Not present  Lines: None     Cardiac Monitoring: None  Code Status: Full Code        Disposition Plan     Medically Ready for Discharge: Anticipated in 2-4 Days     The patient's care was discussed with the Attending Physician, Dr. Faulkner and Patient.    Demond Anaya PA-C  Hospitalist Service, [facility]  Securely message with [employer] (more info)  Text page via [facility] Paging/Directory   See signed in provider for up to date coverage information    ______________________________________________________________________    Chief Complaint   Polydipsia, polyuria, fatigue, constipation, skin lesions    History is obtained from the patient and electronic health record    History of Present Illness   Please refer to ED note by Dr. Kaopor dated 10/14/24 for full details. In brief, Mr. Alirio Nolan is a pleasant 39 yo male with hx of multiple sclerosis and morbid obesity admittiled to [facility] after presenting to ED with polydipsia, polyuria, blurry vision, constipation and groin boils, of which workup revealed new diagnosis  of DM in DKA.     Currently pt admits to ongoing polydipsia, polyuria and constipation. Passing gas. Denies fever, chills, chest pain, SOB, abd pain, and nausea.      Past Medical History    Past Medical History:   Diagnosis Date    MS (multiple sclerosis) (H)        Past Surgical History   History reviewed. No pertinent surgical history.    Prior to Admission Medications   Prior to Admission Medications   Prescriptions Last Dose Informant Patient Reported? Taking?   glatiramer (COPAXONE) 20 MG/ML injection More than a month  Yes Yes   Sig: Inject 20 mg Subcutaneous daily      Facility-Administered Medications: None        Review of Systems    The 10 point Review of Systems is negative other than noted in the HPI or here.     Social History   I have reviewed this patient's social history and updated it with pertinent information if needed.  Social History     Tobacco Use    Smoking status: Every Day    Smokeless tobacco: Never   Substance Use Topics    Alcohol use: No    Drug use: No         Family History           Allergies   No Known Allergies     Physical Exam   Vital Signs: Temp: 97.6  F (36.4  C) Temp src: Oral BP: (!) 151/97 Pulse: 93   Resp: 18 SpO2: 99 % O2 Device: None (Room air)    Weight: 385 lbs 4.8 oz  GEN: In NAD  HEENT: NCAT; PERRL; sclerae non-icteric  LUNGS: CTAB  CV: RRR  ABD: +BSs; Obese, SNTND60  EXT: No BLE edema  SKIN:  exam deferred as pt in hallway with multiple strangers in vicinity.  NEURO: AAOx3; CNs grossly intact; No acute focal deficits noted.      Medical Decision Making       60 MINUTES SPENT BY ME on the date of service doing chart review, history, exam, documentation & further activities per the note.      Data   CMP  Recent Labs   Lab 10/14/24  2026 10/14/24  1637 10/14/24  1633   NA  --   --  136   POTASSIUM  --   --  4.1   CHLORIDE  --   --  103   CO2  --   --  9*   ANIONGAP  --   --  24*   * 286* 266*   BUN  --   --  7.8   CR  --   --  0.76   GFRESTIMATED  --   --  >90    ABDIRASHID  --   --  8.2*   MAG  --   --  1.9   PHOS  --   --  2.6   PROTTOTAL  --   --  7.8  7.6   ALBUMIN  --   --  3.6  3.8   BILITOTAL  --   --  0.4  0.5   ALKPHOS  --   --  104  105   AST  --   --  28  28   ALT  --   --  49  48     CBC  Recent Labs   Lab 10/14/24  1633   WBC 7.4   RBC 5.01   HGB 15.0   HCT 44.4   MCV 89   MCH 29.9   MCHC 33.8   RDW 14.1        Arterial Blood Gas  Recent Labs   Lab 10/14/24  1840   O2PER 99        Venous Blood Gas  Recent Labs   Lab 10/14/24  1840   PHV 7.17*   PCO2V 32*   PO2V 40   HCO3V 12*   HEBER -15.5*   O2PER 99

## 2024-10-15 NOTE — PROGRESS NOTES
Prior Authorization **APPROVED**    Authorization Effective Date: 10/15/2024  Authorization Expiration Date: 10/14/2025  Medication: GLATIRAMER ACETATE 40 MG/ML SC SOSY  Approved Dose/Quantity: 12ml per 28 days  Reference #: CoverMyMeds Key: [id number] - PA Case ID #: [id number]   Insurance Company: [employer] - Phone [phone] Fax [phone]  Expected CoPay: $ 30    CoPay Card Available: No    Foundation Assistance Needed: -  Which Pharmacy is filling the prescription (Not needed for infusion/clinic administered): n/a - Patient has not yet been discharged, and there are no outpatient orders for this medication yet  Comments:  Proactive Prior Authorization. Per insurance, must come from [facility].            Sherry Ramirez CPhT  Discharge Pharmacy Liaison  [facility] [place] Discharge Pharmacy  Pronouns: She/Her/Hers    Securely message with [email or web address], Epic Secure Chat, or clipsync  Phone: [phone]  Fax: [phone]  [email or web address]

## 2024-10-15 NOTE — PROGRESS NOTES
2210 - 2330    6MS ADMISSION    D: Patient admitted/transferred from  ED via [facility] for DKA.     I: Upon arrival to the unit patient was oriented to room, unit, and call light. Patient s height, and vital signs were obtained. Allergies reviewed and allergy band applied. Provider notified of patient s arrival on the unit. Adult AVS completed. Head to toe assessment completed. Education assessment completed. Care plan initiated.    A: Vital signs stable upon admission. Patient rates pain at 0/10. Two RN skin assessment completed, Second RN was Ryan. Significant Skin Findings include a wound located on the R inner thigh, upper third, and Left posterior thigh. [facility] Nurse Consult Ordered YES . Bed Algorithm can be found in PCS flow sheets (Support Surface Algorithm) and on IP [facility] NURSE RESOURCE TAB, was this used during this assessment? No. Was a bariatric bed frame ordered? No. Was an air pump added to the Isoflex mattress? No    P: Continue to monitor patient s glucose and intervene as needed. Continue with plan of care. Notify provider with any concerns or changes in patient status.

## 2024-10-15 NOTE — CONSULTS
OPHTHALMOLOGY CONSULT NOTE      Patient: Alirio Nolan  [facility]   Reason for Consult: Blurry vision left eye   Date of initial evaluation: 10/15/24    ASSESSMENT/PLAN:     Alirio Nolan is a 38 year old male who presents with     # Blurry vision, left eye  # Suspect hyperglycemia-induced lens swelling in setting of DKA  Patient with history of MS and no prior history of diabetes who presents for evaluation of blurry distance vision in the left eye, found to be in DKA. Although his blood glucose has normalized he is still in active DKA at time of examination this morning (AG metabolic acidosis, ketones remain elevated). His vision was checked at distance and near, and found to be better at near (VA 20/20 right eye near compared to 20/25 ph distance; VA 20/25 -1 near left eye compared to 20/30 ph distance). He has no APD, full fields, full and painless EOMs, and 11/11 color plates each eye. His anterior segment examination appears normal. His dilated fundus examination has a slightly anomalous appearance of the right optic nerve with possible disc elevation but does not appear edematous.      Vision subjectively and objectively better at near in the setting of hyperglycemia and active DKA is suggestive of osmotic lens swelling causing myopic shift. His examination is reassuring against optic neuritis, as he has no APD, normal color vision, and no disc edema in the left eye. 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       RECOMMENDATIONS:  - Management of blood sugar and DKA per primary team  - Ophthalmology will repeat examination on 10/16; would expect improving vision if attributable to elevated BG  - Patient should have annual dilated eye exams given new diagnosis of DM    It is our pleasure to participate in this patient's care and treatment. Please contact us with any further questions or concerns.    Shane Kee MD  Resident  "Physician, PGY-2  Department of Ophthalmology      HISTORY OF PRESENTING ILLNESS:     Alirio Nolan is a 38 year old male with history of MS and currently admitted for DKA who presents for evaluation of blurry vision in his left eye.    He noticed the blurry vision left eye one week ago upon waking and it involves his whole visual field. He states the decreased vision is mainly at distance, and that he can see normally at near. He does not think it has substantially improved with treatment of his DKA, although he is still markedly acidotic at time of evaluation (bicarbonate of 13, AG of 17, , ketones elevated 2.95). He thinks there may have been more \"morning gunk\" discharge when this all started. Ocular ROS is negative for vision loss, blurry vision, flashes of light, floaters, eye pain, redness, diplopia, painful eye movements, or change in color vision.     He has MS without complication. He previously had a band-like sensation in his trunk and some numbness in his lower extremities but both resolved a long time ago. He is on glatiramer. He has no prior episodes of optic neuritis. States he has never been evaluated by an eye doctor.    He endorses no vision changes in the right eye. He does not have headaches. He denies transient visual obscurations, pulsatile tinnitus, or tunnel vision. He does not have any new neurologic symptoms besides vision changes.      Review of systems were otherwise negative except for that which has been stated above.      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 No pertinent surgical history.    EXAMINATION:     Base Eye Exam       Visual Acuity (Snellen - Linear) "         Right Left    Dist ph sc 20/25 20/30    Near sc 20/20 20/25 -1              Tonometry (Tonopen, 8:42 AM)         Right Left    Pressure 21 20              Pupils         Dark Light Shape React APD    Right 4 2 Round Brisk None    Left 4 2 Round Brisk None              Visual Fields         Left Right     Full Full              Extraocular Movement         Right Left     Full Full   Fatigable nystagmus in horizontal fields of gaze and up and right gaze             Neuro/Psych       Oriented x3: Yes              Dilation       Both eyes: 1.0% Mydriacyl, 2.5% William Synephrine @ 8:42 AM                  Additional Tests       Color         Right Left    Ishihara 11/11 11/11                  Slit Lamp and Fundus Exam       External Exam         Right Left    External Normal Normal              Portable Slit Lamp Exam         Right Left    Lids/Lashes Normal Normal    Conjunctiva/Sclera White and quiet White and quiet    Cornea Clear Clear    Anterior Chamber Deep, difficult to assess for AC cell with portable slit lamp Deep, difficult to assess for AC cell with portable slit lamp    Iris Round and reactive -> pharmacologically dilated Round and reactive -> pharmacologically dilated    Lens Clear Clear              Fundus Exam         Right Left    Disc Slightly elevated appearance, however no hyperemia, blurred margins, disc hemorrhages or obscuration of vessels Pink, sharp, flat    C/D Ratio 0 0    Macula Normal, sharp FLR, no lesions Normal, sharp FLR, no lesions    Vessels Normal, no sheathing Normal, no sheathing    Periphery Normal, no lesions Normal, no lesions                    Labs/Studies/Imaging Performed:    At time of evaluation:      Bicarb 13  AG 17  Ketones elevated 2.95  CRP elevated 29.42  Hg A1c 12

## 2024-10-15 NOTE — PROGRESS NOTES
Prior Authorization **INITIATED**    Medication: DEXCOM G7 SENSOR MISC  Insurance Company: [employer] - Phone [phone] Fax [phone]  Pharmacy Filling the Rx: n/a - Patient has not yet been discharged, and there are no outpatient orders for this medication yet  Start Date: 10/15/2024  Reference #: CoverMyMeds Key: [id number] - PA Case ID #: [id number]  Comments:  Proactive Prior Authorization      Sherry Ramirez CPhT  Discharge Pharmacy Liaison  [facility] [place] Discharge Pharmacy  Pronouns: She/Her/Hers    Securely message with [employer], Epic Secure Chat, or Perosphere  Phone: [phone]  Fax: [phone]  [email or web address]

## 2024-10-15 NOTE — PROGRESS NOTES
COPAY CARD OBTAINED    Medication: FREESTYLE LITE W/DEVICE KIT  Sponsor: [employer]  Member ID: [id number]  BIN: [id number]  PCN: -  Group: [id number]  Expected Copay: $ 0  Copay card Monthly Max Amount: $ 18  Copay card Annual Amount: $ 18  Comments: Voucher for free meter.      Sherry Ramirez [facility]  Discharge Pharmacy Liaison  [facility] Pharm[facility]  Pronouns: She/Her/Hers    Securely message with [employer], Epic Secure Chat, or [employer]  Phone: [phone]  Fax: [phone]  [email or web address]

## 2024-10-15 NOTE — PROGRESS NOTES
Prior Authorization **INITIATED**    Medication: GLATIRAMER ACETATE 40 MG/ML SC [employer]  Insurance Company: [employer] - Phone [phone] Fax [phone]  Pharmacy Filling the Rx: n/a - Patient has not yet been discharged, and there are no outpatient orders for this medication yet  Start Date: 10/15/2024  Reference #: CoverMyMeds Key: [id number] - PA Case ID #: [id number]  Comments:  Proactive Prior Authorization. Per insurance, must come from [facility].      Sherry Ramirez CPhT  Discharge Pharmacy Liaison  [facility] [place] Pharm[facility]  Pronouns: She/Her/Hers    Securely message with [employer], Epic Secure Chat, or [employer]  Phone: [phone]  Fax: [phone]  [email or web address]

## 2024-10-15 NOTE — CONSULTS
"Diabetes CNS/Educator Consult    Alirio Nolan is a 38 year old male with hx of multiple sclerosis and morbid obesity admitted to [facility] after presenting to ED with polydipsia, polyuria, blurry vision, constipation and groin abscesses, medical work up reflects DKA and unknown underlying DM.     Diabetes Educator consulted for newly diagnosed diabetes. Per notes, likely T2DM, but T1DM antibody results pending. A1c was 12% on 10/14/24.     Inpatient Diabetes Service is following for glycemic management. Please see their notes for plan.    Met with Alirio at bedside for education. He will be managing diabetes cares at home independently. 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 He says he is feeling better overall and ready to learn. He already met with the RD and feels comfortable starting to carb count at home.    Diet: NPO for Medical/Clinical Reasons Except for: Meds, Ice Chips       Barriers to diabetes management: No PCP - Alirio working on establishing care with a new PCP    Diabetes Education Provided:  Discussed reason for requiring blood glucose monitoring and insulin. Discussed difference between T1DM and T2DM. Alirio says he was told about DKA and understands. Discussed A1c and BG targets. Provided \"Understanding Diabetes\" handout.   Reviewed insulin glargine and lispro action, dose, onset, and duration. Discussed proper insulin pen storage, preparation, and injection technique. Discussed pen needle disposal in sharps container. Discussed site selection and rotation. Alirio successfully demonstrated administration with demo pen and injection pad. Provided \"4 mm pen needle - tips for good injection practice\" handout. Discussed frequency and dosing of insulin administration.  Frequency of testing discussed. Reviewed demo One Touch Verio Glucose Meter. Reviewed lancing device set up, safe use, and lancet disposal in sharps. Reviewed meter set up, " memory storage, and testing. Reviewed fingertip site selection and hygiene. Reviewed Dexcom G7 CGM system with patient. Discussed setting up Dexcom G7 jimmie (phone compatible). Discussed 10-day use, site selection, applying sensor, pairing sensor with phone jimmie, warm up period. Discussed reading BG values, viewing trends, setting alarms. Discussed interfering substances, compression lows, and when to check a fingerstick BG. Recommended to call company technical support to help troubleshoot issues.   Discussed signs and symptoms of hypoglycemia. Reviewed treating blood glucose below 70 mg/dL with 15 grams of carbohydrates. Discussed examples of 15 grams of CHO. Reviewed treating blood glucose less than or equal to 50 mg/dL with 30 grams of CHO. Discussed rechecking BG 15 minutes after treatment and retreating if necessary.   Reviewed calling provider for consistently elevated BGs and/or any lows.  Discussed following up with new PCP in 1-2 weeks after discharge.     TENTATIVE Diabetes Plan Reviewed with Patient: patient instructed to refer to discharge papers for final plan  Insulin glargine (Semglee) once daily  Insulin lispro carb coverage TID AC and PRN for snacks  Insulin lispro correction dose TID AC and HS  BG monitoring TID AC and HS  Follow-up with PCP in 1-2 weeks     Supplies Needed at Discharge: please use the DIAB non-branded discharge supply order set ([id number])   One Touch Verio Glucose Meter  One Touch Verio Test Strips  One Touch Delica Lancets  Sharps Container  Alcohol Wipes  B-D 4 mm javi pen needles  Semglee (glargine) pens  Insulin lispro kwikpens  Dexcom G7 sensors - [place] approved 10/15    Diabetes education completed. Alirio was engaged and stated that plan is manageable. He feels comfortable with starting carb counting at discharge after meeting with RD. All questions answered. No concerns. Discussed with IDS CNP. RN aware.     Alirio De APRN, AGCNS  Diabetes Educator  Pager:  [phone]  Office: [phone]  Securely message with [employer]

## 2024-10-16 LAB
ANION GAP SERPL CALCULATED.3IONS-SCNC: 15 MMOL/L (ref 7–15)
ANION GAP SERPL CALCULATED.3IONS-SCNC: 15 MMOL/L (ref 7–15)
ANION GAP SERPL CALCULATED.3IONS-SCNC: 16 MMOL/L (ref 7–15)
BUN SERPL-MCNC: 3.9 MG/DL (ref 6–20)
BUN SERPL-MCNC: 4.3 MG/DL (ref 6–20)
BUN SERPL-MCNC: 4.8 MG/DL (ref 6–20)
CALCIUM SERPL-MCNC: 8.2 MG/DL (ref 8.8–10.4)
CALCIUM SERPL-MCNC: 8.5 MG/DL (ref 8.8–10.4)
CALCIUM SERPL-MCNC: 8.8 MG/DL (ref 8.8–10.4)
CHLORIDE SERPL-SCNC: 102 MMOL/L (ref 98–107)
CHLORIDE SERPL-SCNC: 106 MMOL/L (ref 98–107)
CHLORIDE SERPL-SCNC: 107 MMOL/L (ref 98–107)
CREAT SERPL-MCNC: 0.57 MG/DL (ref 0.67–1.17)
CREAT SERPL-MCNC: 0.6 MG/DL (ref 0.67–1.17)
CREAT SERPL-MCNC: 0.67 MG/DL (ref 0.67–1.17)
EGFRCR SERPLBLD CKD-EPI 2021: >90 ML/MIN/1.73M2
GLUCOSE BLDC GLUCOMTR-MCNC: 117 MG/DL (ref 70–99)
GLUCOSE BLDC GLUCOMTR-MCNC: 120 MG/DL (ref 70–99)
GLUCOSE BLDC GLUCOMTR-MCNC: 124 MG/DL (ref 70–99)
GLUCOSE BLDC GLUCOMTR-MCNC: 134 MG/DL (ref 70–99)
GLUCOSE BLDC GLUCOMTR-MCNC: 134 MG/DL (ref 70–99)
GLUCOSE BLDC GLUCOMTR-MCNC: 143 MG/DL (ref 70–99)
GLUCOSE BLDC GLUCOMTR-MCNC: 145 MG/DL (ref 70–99)
GLUCOSE BLDC GLUCOMTR-MCNC: 149 MG/DL (ref 70–99)
GLUCOSE BLDC GLUCOMTR-MCNC: 159 MG/DL (ref 70–99)
GLUCOSE BLDC GLUCOMTR-MCNC: 159 MG/DL (ref 70–99)
GLUCOSE BLDC GLUCOMTR-MCNC: 163 MG/DL (ref 70–99)
GLUCOSE BLDC GLUCOMTR-MCNC: 167 MG/DL (ref 70–99)
GLUCOSE BLDC GLUCOMTR-MCNC: 168 MG/DL (ref 70–99)
GLUCOSE BLDC GLUCOMTR-MCNC: 169 MG/DL (ref 70–99)
GLUCOSE BLDC GLUCOMTR-MCNC: 170 MG/DL (ref 70–99)
GLUCOSE BLDC GLUCOMTR-MCNC: 181 MG/DL (ref 70–99)
GLUCOSE SERPL-MCNC: 159 MG/DL (ref 70–99)
GLUCOSE SERPL-MCNC: 160 MG/DL (ref 70–99)
GLUCOSE SERPL-MCNC: 179 MG/DL (ref 70–99)
HCO3 SERPL-SCNC: 15 MMOL/L (ref 22–29)
HCO3 SERPL-SCNC: 16 MMOL/L (ref 22–29)
HCO3 SERPL-SCNC: 17 MMOL/L (ref 22–29)
HOLD SPECIMEN: NORMAL
MAGNESIUM SERPL-MCNC: 1.8 MG/DL (ref 1.7–2.3)
PHOSPHATE SERPL-MCNC: 2.6 MG/DL (ref 2.5–4.5)
POTASSIUM SERPL-SCNC: 3.4 MMOL/L (ref 3.4–5.3)
POTASSIUM SERPL-SCNC: 3.5 MMOL/L (ref 3.4–5.3)
POTASSIUM SERPL-SCNC: 3.9 MMOL/L (ref 3.4–5.3)
SODIUM SERPL-SCNC: 135 MMOL/L (ref 135–145)
SODIUM SERPL-SCNC: 137 MMOL/L (ref 135–145)
SODIUM SERPL-SCNC: 137 MMOL/L (ref 135–145)

## 2024-10-16 PROCEDURE — 99233 SBSQ HOSP IP/OBS HIGH 50: CPT | Performed by: NURSE PRACTITIONER

## 2024-10-16 PROCEDURE — 84100 ASSAY OF PHOSPHORUS: CPT | Performed by: INTERNAL MEDICINE

## 2024-10-16 PROCEDURE — 120N000002 HC R&B MED SURG/OB UMMC

## 2024-10-16 PROCEDURE — 250N000009 HC RX 250: Performed by: PHYSICIAN ASSISTANT

## 2024-10-16 PROCEDURE — 250N000011 HC RX IP 250 OP 636: Performed by: INTERNAL MEDICINE

## 2024-10-16 PROCEDURE — 99232 SBSQ HOSP IP/OBS MODERATE 35: CPT | Performed by: INTERNAL MEDICINE

## 2024-10-16 PROCEDURE — 82435 ASSAY OF BLOOD CHLORIDE: CPT | Performed by: NURSE PRACTITIONER

## 2024-10-16 PROCEDURE — 250N000013 HC RX MED GY IP 250 OP 250 PS 637: Performed by: PHYSICIAN ASSISTANT

## 2024-10-16 PROCEDURE — 80048 BASIC METABOLIC PNL TOTAL CA: CPT | Performed by: NURSE PRACTITIONER

## 2024-10-16 PROCEDURE — 80048 BASIC METABOLIC PNL TOTAL CA: CPT | Performed by: INTERNAL MEDICINE

## 2024-10-16 PROCEDURE — 250N000013 HC RX MED GY IP 250 OP 250 PS 637: Performed by: EMERGENCY MEDICINE

## 2024-10-16 PROCEDURE — 36415 COLL VENOUS BLD VENIPUNCTURE: CPT | Performed by: INTERNAL MEDICINE

## 2024-10-16 PROCEDURE — 83735 ASSAY OF MAGNESIUM: CPT | Performed by: INTERNAL MEDICINE

## 2024-10-16 PROCEDURE — 82310 ASSAY OF CALCIUM: CPT | Performed by: INTERNAL MEDICINE

## 2024-10-16 PROCEDURE — 258N000003 HC RX IP 258 OP 636: Performed by: INTERNAL MEDICINE

## 2024-10-16 PROCEDURE — 250N000013 HC RX MED GY IP 250 OP 250 PS 637: Performed by: INTERNAL MEDICINE

## 2024-10-16 PROCEDURE — 36415 COLL VENOUS BLD VENIPUNCTURE: CPT | Performed by: NURSE PRACTITIONER

## 2024-10-16 RX ORDER — BISACODYL 10 MG
10 SUPPOSITORY, RECTAL RECTAL DAILY
Status: DISCONTINUED | OUTPATIENT
Start: 2024-10-16 | End: 2024-10-17 | Stop reason: HOSPADM

## 2024-10-16 RX ORDER — MAGNESIUM CARB/ALUMINUM HYDROX 105-160MG
296 TABLET,CHEWABLE ORAL ONCE
Status: DISCONTINUED | OUTPATIENT
Start: 2024-10-16 | End: 2024-10-17 | Stop reason: HOSPADM

## 2024-10-16 RX ORDER — SODIUM PHOSPHATE,MONO-DIBASIC 19G-7G/118
1 ENEMA (ML) RECTAL DAILY PRN
Status: DISCONTINUED | OUTPATIENT
Start: 2024-10-16 | End: 2024-10-17 | Stop reason: HOSPADM

## 2024-10-16 RX ORDER — GLATIRAMER 40 MG/ML
40 INJECTION, SOLUTION SUBCUTANEOUS
Status: DISCONTINUED | OUTPATIENT
Start: 2024-10-18 | End: 2024-10-17 | Stop reason: HOSPADM

## 2024-10-16 RX ORDER — ESCITALOPRAM OXALATE 10 MG/1
20 TABLET ORAL DAILY
Status: DISCONTINUED | OUTPATIENT
Start: 2024-10-16 | End: 2024-10-17 | Stop reason: HOSPADM

## 2024-10-16 RX ADMIN — CEPHALEXIN 500 MG: 500 CAPSULE ORAL at 06:29

## 2024-10-16 RX ADMIN — NYSTATIN: 100000 CREAM TOPICAL at 19:39

## 2024-10-16 RX ADMIN — SENNOSIDES AND DOCUSATE SODIUM 2 TABLET: 8.6; 5 TABLET ORAL at 19:40

## 2024-10-16 RX ADMIN — BISACODYL 10 MG: 10 SUPPOSITORY RECTAL at 12:56

## 2024-10-16 RX ADMIN — ESCITALOPRAM OXALATE 20 MG: 10 TABLET ORAL at 17:06

## 2024-10-16 RX ADMIN — NYSTATIN 500000 UNITS: 100000 SUSPENSION ORAL at 09:52

## 2024-10-16 RX ADMIN — CEPHALEXIN 500 MG: 500 CAPSULE ORAL at 00:14

## 2024-10-16 RX ADMIN — CEPHALEXIN 500 MG: 500 CAPSULE ORAL at 17:06

## 2024-10-16 RX ADMIN — SULFAMETHOXAZOLE AND TRIMETHOPRIM 2 TABLET: 800; 160 TABLET ORAL at 19:40

## 2024-10-16 RX ADMIN — MICONAZOLE NITRATE: 20 POWDER TOPICAL at 19:39

## 2024-10-16 RX ADMIN — NYSTATIN 500000 UNITS: 100000 SUSPENSION ORAL at 19:39

## 2024-10-16 RX ADMIN — SODIUM CHLORIDE, POTASSIUM CHLORIDE, SODIUM LACTATE AND CALCIUM CHLORIDE 1000 ML: 600; 310; 30; 20 INJECTION, SOLUTION INTRAVENOUS at 08:51

## 2024-10-16 RX ADMIN — NYSTATIN 500000 UNITS: 100000 SUSPENSION ORAL at 12:26

## 2024-10-16 RX ADMIN — SULFAMETHOXAZOLE AND TRIMETHOPRIM 2 TABLET: 800; 160 TABLET ORAL at 09:53

## 2024-10-16 RX ADMIN — INSULIN HUMAN 6 UNITS/HR: 1 INJECTION, SOLUTION INTRAVENOUS at 05:28

## 2024-10-16 RX ADMIN — CEPHALEXIN 500 MG: 500 CAPSULE ORAL at 12:26

## 2024-10-16 RX ADMIN — NYSTATIN 500000 UNITS: 100000 SUSPENSION ORAL at 17:06

## 2024-10-16 RX ADMIN — SENNOSIDES AND DOCUSATE SODIUM 2 TABLET: 8.6; 5 TABLET ORAL at 09:53

## 2024-10-16 RX ADMIN — POTASSIUM CHLORIDE AND SODIUM CHLORIDE: 900; 150 INJECTION, SOLUTION INTRAVENOUS at 00:15

## 2024-10-16 ASSESSMENT — ACTIVITIES OF DAILY LIVING (ADL)
ADLS_ACUITY_SCORE: 20
ADLS_ACUITY_SCORE: 18
ADLS_ACUITY_SCORE: 20
ADLS_ACUITY_SCORE: 18
ADLS_ACUITY_SCORE: 20
ADLS_ACUITY_SCORE: 18
ADLS_ACUITY_SCORE: 18
ADLS_ACUITY_SCORE: 20
ADLS_ACUITY_SCORE: 18
ADLS_ACUITY_SCORE: 20
ADLS_ACUITY_SCORE: 18
ADLS_ACUITY_SCORE: 20
ADLS_ACUITY_SCORE: 18

## 2024-10-16 NOTE — PROGRESS NOTES
Alert and oriented times four. NPO. Blood to be drawn at 1630. Provider will look at results and assess if diet can be restarted. BG to be checked at 1530 and the Algorithm 2 should be used to determine dosing. Patient continent of bowel and bladder. Large BM 10/16 after suppository inserted. No complaints of pain. Takes pills whole.     Plan of care reviewed with patient.     Patient overall: improving    Patient's most recent vital signs are:     Vital signs:  BP: 133/70  Temp: 97.9  HR: 75  RR: 16  SpO2: 98 %     Patient does not have new respiratory symptoms.  Patient does not have new sore throat.  Patient does not have a fever greater than 99.5.

## 2024-10-16 NOTE — PLAN OF CARE
[id number]    Goal Outcome Evaluation:      Plan of Care Reviewed With: patient    Overall Patient Progress: improvingOverall Patient Progress: improving    Patient is A&O x4. Call light within reach. Able to make needs known. Independent in the room. Voids spontaneously to the bathroom/ LBM: 10/14 per patient, scheduled senna given.     RA. NPO. L PIV infusing with NS+Kcl 20mEq/L at 100ml/hr. R PIV infusing with insulin drip at 4 units/hr, Alg 4. Denies pain, SOB, chest pain, n/v and n/t. Blurry vision. Bilateral groin abscess and dried scabs noted. Scheduled ointment and miconazole powder applied, secured with ABD pad and mesh.     RN managed K, Mg and Phos. AM lab draws placed. Continue with POC.

## 2024-10-16 NOTE — PROGRESS NOTES
Inpatient Diabetes Management Service: Daily Progress Note     HPI: Mr. Alirio Nolan is a pleasant 37 yo male with hx of multiple sclerosis and morbid obesity admitted to [facility] after presenting to ED with polydipsia, polyuria, blurry vision, constipation and groin abscesses, medical work up reflects DKA and unknown underlying DM.      IDS consulted to assist with glycemic management and optimization while inpatient and when applicable, recommendations for transition home.           Assessment/Plan:     Assessment:      New onset Diabetes Mellitus presenting in DKA with glucose of 266, AG 24, bicarb 9, ketones of 7.16, acidotic with VBG of 7.17, Osmolality 311, Phos 2.3.  c/b skin/groin abscesses and insulin resistance.  A1c 12.0%  Acute hyperglycemia; improving  Constipation  MS; chronic/stable  BMI:  55     Plan/Recommendations:  Please notify Inpatient Diabetes Service if changes are planned to steroids, nutrition, TPN/TF and anticipated procedures requiring prolonged NPO status.      - Continues on IV insulin drip, Ok to change to non-DKA protocol - likely to be able to transition off today given trends  - Lantus 100 unit(s) q 24 hrs at 0800, once administered, decrease to Alg 2 for safety  - Lantus 80 unit(s) q24 hours at 1900 and 2 hours later, STOP drip   - Novolog Meal Coverage: 1 units per 3 g CHO, TID AC and PRN with snacks/supplements once PO resumes - suspect he will need more ICR but this needs to be assessed before escalated further  - BG monitoring: Every hour on insulin drip per IV insulin protocol then TID AC / HS / 0200  - DM labs - GERI ab, islet ab - will take days to return - will not change tx.  - Hypoglycemia protocol    - Carb consistent diet with counting protocol recommended  - IDS to continue to follow.  Please do not hesitate to contact the team with questions/concerns 0700 - 1700 hrs.      Discussion:        Gap closed 10/15, tolerated eating when he had  breakfast without nausea   Will repeat BMP to determine gap clsed this AM before transitioning off drip, which it has but acidosis /bicarb not yet resolved. Remains ++ constipated.        Did meet with Diab education on 10/15, went well. He really want to go home.  Moving the process along as much as safely reasonable to do so.  He has a significant amount of insulin resistance and we discussed that while objectively the amount of insulin is what he needs, once the glucose toxicity and resistance resolves, this could change and he would be at risk for hypoglycemia and prolonged hypoglycemia given the high doses, he understands.     The dosing now is heavy basal and this is while he is NPO.  Once he begins to eat, it will be undetermined how much that will impact.  ICR and ICF is calculated off what basal needs are at this time.     Given these high amounts, it is entirely possible he could be a good candidate for U-500 in the future or Tuojeo.    Addm 1900 hrs:  trending through evening and bicarb not sig improved, however also has not eaten.  Do see now primary advancing diet. Ok with transition off drip   Team notes medium resistance correction - very high resistance has been ordered.   Also, do recommend still a 2 hour transition time for the PM lantus to stopping drip especially if he is going to eat.    Low threshold to resume drip if metabolically worsens.   Would consider concurrently running LR or plasmalyte with IV insulin drip if prolonged ketosis and derangements persist vs additional source for underlying cause.  Potentially once severe constipation is alleviated, this will also assist with resolution of derangements.       Tentative Discharge Planning - to be finalized at/near day of discharge:    Medications:  Lantus/Novolog   Test Claims: ordered on 10/15, please see pharm liasion note for details.   Education: Ordered on 10/15 . completed.    Outpatient Follow-up: recommend [facility]  "    Please allow for 24 hours when asking for final discharge recommendations.  Requests made less than this cannot be guaranteed to be done on an urgent/emergent basis, especially last minute for lengthy/complex steroid tapers.        Interval History/Review of Systems :   The last 24 hours progress and nursing notes reviewed.    ++ struggling with constipation again. Still not eating - did briefly eat then made NPO again.   Really wants to discharge.       Inpatient Glucose Control:     Planned Procedures/Surgeries: none         Medications Impacting Glycemia:   Steroids:  no   D5W-containing solutions/medications: none   Other medications impacting glucose: none         Nutrition:   Orders Placed This Encounter      NPO for Medical/Clinical Reasons Except for: Meds, Ice Chips  Supplements: no   TF: no /  TPN: no         Diabetes History: see full consult note for complete diabetes history   Diabetes Type and Duration: New diagnosis - A1c 10/14/2024: 12.0%     PTA Medication Regimen: n/a  Historical Diabetes Medications:     11/21/2018:  4.8%     Glucose monitoring device and frequency: n/a  Outpatient Diabetes Provider: n/a  Formal Diabetes Education/Educator: n/a     Physical Exam:   BP (!) 146/74 (BP Location: Left arm, Patient Position: Semi-Marcano's, Cuff Size: Adult Large)   Pulse 77   Temp 98.7  F (37.1  C) (Oral)   Resp 16   Ht 1.778 m (5' 10\")   Wt (!) 178.3 kg (393 lb 1.6 oz)   SpO2 98%   BMI 56.40 kg/m    General:  uncomfortable but stable appearing  HEENT:  NC/AT. MMM, sclera not injected, hearing intact  Lungs:  unremarkable, no new cough, no SOB  ABD:  rounded  Skin:  warm and dry, no obvious lesions  Feet:   warm, CMS intact  MSK:   moving all extremities  Mental Status:  Alert and oriented x3  Psych:   Cooperative, good eye contact, full/appropriate affect         Data:     Lab Results   Component Value Date    A1C 12.0 10/14/2024       ROUTINE IP LABS (Last four results)  BMP  Recent Labs "   Lab 10/16/24  0517 10/16/24  0428 10/16/24  0328 10/16/24  0230 10/15/24  2324 10/15/24  2205 10/15/24  1831 10/15/24  1813 10/15/24  1534 10/15/24  1434 10/15/24  1031 10/15/24  1022 10/15/24  0713 10/15/24  0654   NA  --   --   --   --   --   --   --  137  --  133*  --  136  --  142   POTASSIUM  --   --   --   --   --  3.7  --  4.8  --  3.3*  --  3.5  --  3.9   CHLORIDE  --   --   --   --   --   --   --  111*  --  103  --  106  --  112*   ABDIRASHID  --   --   --   --   --   --   --  8.6*  --  8.7*  --  8.5*  --  8.7*   CO2  --   --   --   --   --   --   --  11*  --  15*  --  14*  --  13*   BUN  --   --   --   --   --   --   --  6.9  --  7.2  --  7.5  --  7.6   CR  --   --   --   --   --   --   --  0.67  --  0.69  --  0.64*  --  0.67   * 159* 167* 159*   < >  --    < > 134*   < > 169*   < > 231*   < > 149*    < > = values in this interval not displayed.     CBC  Recent Labs   Lab 10/15/24  0654 10/14/24  1633   WBC 6.1 7.4   RBC 4.82 5.01   HGB 13.8 15.0   HCT 42.4 44.4   MCV 88 89   MCH 28.6 29.9   MCHC 32.5 33.8   RDW 14.3 14.1    186     INRNo lab results found in last 7 days.    Plan discussed with patient, bedside RN, and primary team via this note.    Barby Murray, AUBREY CNP, BC-ADM   Inpatient Diabetes Service  Available on [facility] when on duty.     To contact Inpatient Diabetes Service:     7 AM - 5 PM: Page the [employer] MARIANNA following the patient that day (see filed or incomplete progress notes/consult notes under Endocrinology)    OR if uncertain of provider assignment: page job code [phone]    5 PM - 7 AM: First call after hours is to primary service.    For urgent after-hours questions, page job code for on call fellow: [phone]     I spent a total of 50 minutes on the date of the encounter doing prep/post-work, chart review, history and exam, documentation and further activities per the note including lab review, multidisciplinary communication, counseling the patient and/or coordinating care  regarding acute hyper/hypoglycemic management, as well as discharge management and planning/communication.  See note for details.      Please notify inpatient diabetes service if changes are planned to steroids, nutrition, or if procedures are planned requiring prolonged NPO status.Diabetes Management Team job code: [phone]

## 2024-10-16 NOTE — PROGRESS NOTES
[facility]    Medicine Progress Note - Hospitalist Service, GOLD TEAM 19    Date of Admission:  10/14/2024    Assessment & Plan   Mr. Alirio Nolan is a pleasant 39 yo male with hx of multiple sclerosis and morbid obesity admittiled to [facility] on 10/14 after presenting to the ED with polydipsia, polyuria, blurry vision, constipation and groin boils.  Workup revealed new diagnosis T2DM with DKA.      Newly diagnosed diabetes mellitus with DKA   ---   Has a FH of diabetes  ---   Presented to the ED with ~ 1 wk h/o polydipsia, polyuria, blurry vision (mainly L eye) and fatigue.   ---   Initial glucose was 286, CO3 9, AG 24, pH 7.17, serum ketones 7.16, and Hgba1c was 12.0% w/ EAG of 298  ---   AG closed but bicarb is low and B hydroxybutyrate remained elevated  ---   S/p one liter LR bolus yesterday and again this am  ---   Continue NS + 20 meq Kcl at 100 ml/h  ---   Continue Insulin drip and lytes replacement protocol  ---   Continue Lantus 100 units qam and 60 units qpm  ---   Will Novolog 1 units per 3 g carb and very high intensity Novolog sliding scale insulin when he start to take oral diet   ---   Endocrine service consulted  ---   Continue NPO for now pending endocrine team eval  ---   Glutamic acid decarboxylase antibody and Islet Antigen (IA-2) Autoantibody to distinguish between type 1 or 2 results pending     Left eye blurry vision  ---   Likely due to hyperglycemia  ---   Pt has underlying MS thus ophthalmology service consulted for further evaluation  ---   Evaluated by Ophthalmology this am and their assessment is that pt's blurry vision is due to hyperglycemia-induced lens swelling in setting of DKA  ---   Continue insulin drip and IVF for treatment of DKA  ---   Ophthalmology will reexamine patient on 10/16     Oral and penile erythema and discomfort  Perineal abscesses  ---   Sxs present for the past 1.5 weeks.   ---   States wife has yeast infection, of  which similar sxs developed on his penis, and now feels tongue discomfort with dry mouth.   ---   Also with painful boils in groin regon that have already opened up and draining. Denies treatment.   ---   Denies fever and chills.   ---   WBC WNL. Initial CRP 18.9.   ---   Continue Keflex and Bactrim started in ED  ---   Start oral and topical nystatin  ---   WOCN consulted; appreciate rec's     Acute constipation  ---   LBM ~ 1 week ago, of which he states it was hard and difficult to pass.   ---   Denied prior hx of constipation.   ---   He is passing gas.   ---   Abs xray at admit with moderate stool burden otherwise unremarkable.   ---   Abd exam unremarkable.   ---   Had a BM at 3 am on 10/15  ---   He still feels constipated and requesting bowel meds  ---   Continue Miralax and Senokot-S  ---   Add dulcolax pr daily and fleet enema daily as needed  ---   Mg citrate available if above meds do not help     Multiple sclerosis  ---   Diagnosed ~ 10 yrs ago after developing BLE paresthesia  ---   Chronically managed on Copaxone 3x weekly, continue      Hypophosphatemia  ---   Replaced per protocol              Diet: NPO for Medical/Clinical Reasons Except for: Meds, Ice Chips    DVT Prophylaxis: 6  Gray Catheter: Not present  Lines: None     Cardiac Monitoring: None  Code Status: Full Code    Disposition Plan   Medically Ready for Discharge:  Anticipated discharge in 1-2 days        Polina Hoffman MD  Hospitalist Service, GOLD TEAM 19  [facility]  Securely message with [employer] (more info)  Text page via [employer] Paging/Directory   See signed in provider for up to date coverage information  ______________________________________________________________________    Interval History   Feels constipated  He is requesting bowel meds  He did not sleep well last 2 nights due to q1 hr accu check  DKA has not resolved   Uneventful night    Physical Exam   Vital Signs: Temp: 97.9  F  (36.6  C) Temp src: Oral BP: 133/70 Pulse: 75   Resp: 16 SpO2: 98 % O2 Device: None (Room air)    Weight: 393 lbs 1.6 oz  General: Morbidly obese, aao x 3, NAD.  HEENT:  NC/AT, PERRL, EOMI, neck supple, no thyromegaly, op clear, mmm.  CVS:  NL s 1 and s2, no m/r/g.  Lungs:  CTA B/L.   Abd:  Soft, hypoactive bs, NT, no rebound or gaurding, no fluid shift.  Ext:  No c/c.  Lymph:  No edema.  Neuro:  Nonfocal.  Musculoskeletal: No calf tenderness to palpation.    Skin:  No rash.  Psychiatry:  Mood and affect appropriate.      Data     I have personally reviewed the following data over the past 24 hrs:    N/A  \   N/A   / N/A     137 111 (H) 6.9 /  149 (H)   3.7 11 (L) 0.67 \       Imaging results reviewed over the past 24 hrs:   No results found for this or any previous visit (from the past 24 hour(s)).

## 2024-10-16 NOTE — PROGRESS NOTES
AG closed but DKA has not resolved yet    Pt did not sleep last 2 nights because of q1 hr accu check    Lantus 80 units subcutaneous at 7 pm tonight    Novolog sliding scale insulin and carb count as ordered by endocrine    Discontinue insulin drip at 8 pm tonight    Start moderate carb consistency diet        Polina Hoffman MD.   Hospitalist.  [phone], pager.

## 2024-10-17 ENCOUNTER — DOCUMENTATION ONLY (OUTPATIENT)
Dept: MEDSURG UNIT | Facility: CLINIC | Age: 39
End: 2024-10-17
Payer: COMMERCIAL

## 2024-10-17 ENCOUNTER — TELEPHONE (OUTPATIENT)
Dept: OPHTHALMOLOGY | Facility: CLINIC | Age: 39
End: 2024-10-17
Payer: COMMERCIAL

## 2024-10-17 VITALS
RESPIRATION RATE: 18 BRPM | HEART RATE: 75 BPM | WEIGHT: 315 LBS | DIASTOLIC BLOOD PRESSURE: 84 MMHG | SYSTOLIC BLOOD PRESSURE: 155 MMHG | BODY MASS INDEX: 45.1 KG/M2 | HEIGHT: 70 IN | OXYGEN SATURATION: 98 % | TEMPERATURE: 98.7 F

## 2024-10-17 LAB
ANION GAP SERPL CALCULATED.3IONS-SCNC: 12 MMOL/L (ref 7–15)
ANION GAP SERPL CALCULATED.3IONS-SCNC: 14 MMOL/L (ref 7–15)
BUN SERPL-MCNC: 4 MG/DL (ref 6–20)
BUN SERPL-MCNC: 4.9 MG/DL (ref 6–20)
CALCIUM SERPL-MCNC: 8.7 MG/DL (ref 8.8–10.4)
CALCIUM SERPL-MCNC: 8.9 MG/DL (ref 8.8–10.4)
CHLORIDE SERPL-SCNC: 100 MMOL/L (ref 98–107)
CHLORIDE SERPL-SCNC: 105 MMOL/L (ref 98–107)
CREAT SERPL-MCNC: 0.63 MG/DL (ref 0.67–1.17)
CREAT SERPL-MCNC: 0.72 MG/DL (ref 0.67–1.17)
EGFRCR SERPLBLD CKD-EPI 2021: >90 ML/MIN/1.73M2
EGFRCR SERPLBLD CKD-EPI 2021: >90 ML/MIN/1.73M2
GAD65 AB SER IA-ACNC: <5 IU/ML
GLUCOSE BLDC GLUCOMTR-MCNC: 140 MG/DL (ref 70–99)
GLUCOSE BLDC GLUCOMTR-MCNC: 170 MG/DL (ref 70–99)
GLUCOSE BLDC GLUCOMTR-MCNC: 174 MG/DL (ref 70–99)
GLUCOSE BLDC GLUCOMTR-MCNC: 185 MG/DL (ref 70–99)
GLUCOSE SERPL-MCNC: 141 MG/DL (ref 70–99)
GLUCOSE SERPL-MCNC: 193 MG/DL (ref 70–99)
HCO3 SERPL-SCNC: 19 MMOL/L (ref 22–29)
HCO3 SERPL-SCNC: 22 MMOL/L (ref 22–29)
HOLD SPECIMEN: NORMAL
ISLET CELL512 AB SER IA-ACNC: <5.4 U/ML
MAGNESIUM SERPL-MCNC: 1.7 MG/DL (ref 1.7–2.3)
PHOSPHATE SERPL-MCNC: 3.4 MG/DL (ref 2.5–4.5)
POTASSIUM SERPL-SCNC: 3 MMOL/L (ref 3.4–5.3)
POTASSIUM SERPL-SCNC: 3.5 MMOL/L (ref 3.4–5.3)
SODIUM SERPL-SCNC: 133 MMOL/L (ref 135–145)
SODIUM SERPL-SCNC: 139 MMOL/L (ref 135–145)

## 2024-10-17 PROCEDURE — 83735 ASSAY OF MAGNESIUM: CPT | Performed by: INTERNAL MEDICINE

## 2024-10-17 PROCEDURE — 99239 HOSP IP/OBS DSCHRG MGMT >30: CPT | Performed by: INTERNAL MEDICINE

## 2024-10-17 PROCEDURE — 36415 COLL VENOUS BLD VENIPUNCTURE: CPT | Performed by: INTERNAL MEDICINE

## 2024-10-17 PROCEDURE — 84100 ASSAY OF PHOSPHORUS: CPT | Performed by: INTERNAL MEDICINE

## 2024-10-17 PROCEDURE — 250N000013 HC RX MED GY IP 250 OP 250 PS 637: Performed by: PHYSICIAN ASSISTANT

## 2024-10-17 PROCEDURE — 99233 SBSQ HOSP IP/OBS HIGH 50: CPT

## 2024-10-17 PROCEDURE — 250N000013 HC RX MED GY IP 250 OP 250 PS 637: Performed by: EMERGENCY MEDICINE

## 2024-10-17 PROCEDURE — 80048 BASIC METABOLIC PNL TOTAL CA: CPT | Performed by: INTERNAL MEDICINE

## 2024-10-17 PROCEDURE — 250N000013 HC RX MED GY IP 250 OP 250 PS 637: Performed by: INTERNAL MEDICINE

## 2024-10-17 RX ORDER — ACYCLOVIR 400 MG/1
TABLET ORAL
Qty: 1 EACH | Refills: 0 | Status: SHIPPED | OUTPATIENT
Start: 2024-10-17

## 2024-10-17 RX ORDER — ACYCLOVIR 400 MG/1
TABLET ORAL
Qty: 3 EACH | Refills: 5 | Status: SHIPPED | OUTPATIENT
Start: 2024-10-17 | End: 2024-10-31

## 2024-10-17 RX ORDER — PROCHLORPERAZINE 25 MG/1
SUPPOSITORY RECTAL
Qty: 3 EACH | Refills: 5 | Status: SHIPPED | OUTPATIENT
Start: 2024-10-17 | End: 2024-10-31

## 2024-10-17 RX ORDER — POTASSIUM CHLORIDE 1500 MG/1
40 TABLET, EXTENDED RELEASE ORAL ONCE
Status: COMPLETED | OUTPATIENT
Start: 2024-10-17 | End: 2024-10-17

## 2024-10-17 RX ORDER — SULFAMETHOXAZOLE AND TRIMETHOPRIM 800; 160 MG/1; MG/1
2 TABLET ORAL 2 TIMES DAILY
Qty: 4 TABLET | Refills: 0 | Status: SHIPPED | OUTPATIENT
Start: 2024-10-17 | End: 2024-10-31

## 2024-10-17 RX ORDER — POTASSIUM CHLORIDE 1500 MG/1
20 TABLET, EXTENDED RELEASE ORAL ONCE
Status: COMPLETED | OUTPATIENT
Start: 2024-10-17 | End: 2024-10-17

## 2024-10-17 RX ORDER — CEPHALEXIN 500 MG/1
500 CAPSULE ORAL EVERY 6 HOURS
Qty: 8 CAPSULE | Refills: 0 | Status: SHIPPED | OUTPATIENT
Start: 2024-10-17 | End: 2024-10-31

## 2024-10-17 RX ADMIN — CEPHALEXIN 500 MG: 500 CAPSULE ORAL at 17:19

## 2024-10-17 RX ADMIN — NYSTATIN 500000 UNITS: 100000 SUSPENSION ORAL at 11:48

## 2024-10-17 RX ADMIN — ESCITALOPRAM OXALATE 20 MG: 10 TABLET ORAL at 08:35

## 2024-10-17 RX ADMIN — SULFAMETHOXAZOLE AND TRIMETHOPRIM 2 TABLET: 800; 160 TABLET ORAL at 08:35

## 2024-10-17 RX ADMIN — POTASSIUM CHLORIDE 40 MEQ: 1500 TABLET, EXTENDED RELEASE ORAL at 09:46

## 2024-10-17 RX ADMIN — SENNOSIDES AND DOCUSATE SODIUM 2 TABLET: 8.6; 5 TABLET ORAL at 08:35

## 2024-10-17 RX ADMIN — CEPHALEXIN 500 MG: 500 CAPSULE ORAL at 06:33

## 2024-10-17 RX ADMIN — NYSTATIN 500000 UNITS: 100000 SUSPENSION ORAL at 17:17

## 2024-10-17 RX ADMIN — NYSTATIN 500000 UNITS: 100000 SUSPENSION ORAL at 08:35

## 2024-10-17 RX ADMIN — POTASSIUM CHLORIDE 20 MEQ: 1500 TABLET, EXTENDED RELEASE ORAL at 11:49

## 2024-10-17 RX ADMIN — CEPHALEXIN 500 MG: 500 CAPSULE ORAL at 11:49

## 2024-10-17 RX ADMIN — CEPHALEXIN 500 MG: 500 CAPSULE ORAL at 01:48

## 2024-10-17 ASSESSMENT — ACTIVITIES OF DAILY LIVING (ADL)
ADLS_ACUITY_SCORE: 20

## 2024-10-17 NOTE — DISCHARGE INSTRUCTIONS
Inpatient Diabetes Service Recommendations for Discharge:    Blood Glucose Checks: Three times daily before meals, and at bedtime. . Start using the Dexcom G7 Sensor- download jimmie on your phone and follow steps to connect.     Medications:   Long Acting Insulin   Semglee 100 units at 8 AM   Semglee 80 units at 8 PM    If your blood sugar is <100 (fasting) in the morning reduce the morning dose by 20 units  If your blood sugar is <70 overnight, cut the morning dose in half or reduce by 40-50 units.   If your blood sugars during the day are <100, consider reducing the evening dose of Lantus by 20-30 units  If you continue to experience low blood sugars after these dose reductions, STOP one of the Lantus doses and follow up ASAP.    Short Acting Meal Insulin:    Lispro 1 unit per 3 grams of carbohydrate three times daily with meals plus correction scale:   Novolog correction scale three times a day before meals and at bedtime. Do not take more frequently than every 4 hours. See scale below:       Correction Scale - VERY HIGH INSULIN RESISTANCE DOSING      Do Not give Correction Insulin if fasting BG less than 140.    For  - 149 give 1 unit.    For  - 159 give 2 units.    For  - 169 give 3 units.    For  - 179 give 4 units.    For  - 189 give 5 units.    For  - 199 give 6 units.    For  - 209 give 7 units.    For  - 219 give 8 units.    For  - 229 give 9 units.    For  - 239 give 10 units.    For  - 249 give 11 units.    For  - 259 give 12 units.    For  - 269 give 13 units.    For  - 279 give 14 units.    For  - 289 give 15 units.    For  - 299 give 16 units.    For  - 309 give 17 units.    For  - 319 give 18 units.    For  - 329 give 19 units.    For  - 339 give 20 units.    For  - 349 give 21 units.    For BG greater than or equal 350 give 22 units.    To be given with prandial insulin, and  based on fasting blood glucose.      Take this scale at bedtime     Correction Scale - VERY HIGH INSULIN RESISTANCE DOSING      Do Not give Bedtime Correction Insulin if BG less than 200.    For  - 209 give 1 units.    For  - 219 give 2 units.    For  - 229 give 3 units.    For  - 239 give 4 units.    For  - 249 give 5 units.    For  - 259 give 6 units.    For  - 269 give 7 units.    For  - 279 give 8 units.    For  - 289 give 9 units.    For  - 299 give 10 units.    For  - 309 give 11 units.    For  - 319 give 12 units.    For  - 329 give 13 units.    For  - 339 give 14 units.    For  - 349 give 15 units.    For BG greater than or equal to 350 give 16 units.          Diabetes Outpatient Follow-up: Follow up with  your PCP,  in 1-2 weeks after discharge for your diabetes.  Follow up with [facility] in 1 weeks after discharge. An appointment request was sent to the [facility] coordinator to schedule your outpatient diabetes appointment. Please call the clinic at [phone] if you do not hear from them after discharge, or if you have non-urgent questions regarding your blood sugars or insulin. Follow up sooner if blood glucose runs consistently greater than 180 mg/dL or any episodes less than 70 mg/dL.       Hypoglycemia (Low Blood Glucose) Management:  If blood glucose is 51 to 69:   Eat or drink 15 grams of carbohydrate. Examples:   1/2 cup (4 ounces) apple juice or regular soda pop, or   1 cup (8 ounces) milk, or   15 skittles, or   3 to 4 glucose tablets.   Re-check your blood glucose in 15 minutes.   Repeat these steps every 15 minutes until your blood glucose is above 80.       If blood glucose is 50 or less:   Eat or drink 30 grams of carbohydrate. Examples:   1 cup (8 ounces) apple juice or regular soda pop, or   2 cups (16 ounces) milk, or   1 banana, or   6 to 8 glucose tablets.   Re-check  your blood glucose in 15 minutes.   Repeat these steps every 15 minutes until your blood glucose is above 80.     Thank you for letting the Diabetes Management Team be involved in your care!

## 2024-10-17 NOTE — PLAN OF CARE
"[id number]   Goal Outcome Evaluation:     Plan of Care Reviewed With: patient     Overall Patient Progress: no change     Outcome Evaluation:     VS: Blood pressure (!) 145/77, pulse 80, temperature 98.3  F (36.8  C), temperature source Oral, resp. rate 18, height 1.778 m (5' 10\"), weight (!) 178.3 kg (393 lb 1.6 oz), SpO2 97%.     O2: SpO2 > 97 and stable on RA. LS clear and equal bilaterally. Denies chest pain and SOB.    Output: Voids spontaneously without difficulty to bathroom /   Last BM: denies abdominal discomfort. BS active / passing flatus. Last bowel movement 10/16/2024   Activity: Independent in the room   Skin: WDL except, redness and open area on groin   Pain: Denies pain    CMS: Intact, AOx4. Denies numbness and tingling.   Dressing: NA   Diet: 60 gram carb diet  Denies nausea/vomiting.   BG checks before meals and at bedtime. BG check at bedtime 181.   LDA: R PIV SL /   Equipment: IV pole, personal belongings,    Plan: Standard  precautions maintained / Continue with plan of care. Call light within reach, pt able to make needs known.    Additional Info: On magnesium, phosphors, and potassium   Lab order completed on previous shift  Insulin drip stopped at 2000  Patient receiving subcutaneous insulin.  Last blood sugar reading 181            "

## 2024-10-17 NOTE — PROGRESS NOTES
Inpatient Diabetes Management Service: Daily Progress Note     HPI: Mr. Alirio Nolan is a pleasant 37 yo male with hx of multiple sclerosis and morbid obesity admitted to [facility] after presenting to ED with polydipsia, polyuria, blurry vision, constipation and groin abscesses, medical work up reflects DKA and unknown underlying DM.      IDS consulted to assist with glycemic management and optimization while inpatient and when applicable, recommendations for transition home.         Assessment/Plan:     Assessment:   New onset Diabetes Mellitus presenting in DKA with glucose of 266, AG 24, bicarb 9, ketones of 7.16, acidotic with VBG of 7.17, Osmolality 311, Phos 2.3.  c/b skin/groin abscesses and insulin resistance.  A1c 12.0%  Acute hyperglycemia; improving  Constipation  MS; chronic/stable  BMI:  55    Plan/Recommendations:   - Continue Lantus 100 unit(s) q 24 hrs at 0800  - Continue Lantus 80 unit(s) q24 hours at 1900   - Novolog Meal Coverage: 1 units per 3 g CHO, TID AC and PRN with snacks/supplements once PO resumes - suspect he will need more ICR but this needs to be assessed before escalated further  - BG monitoring:  TID AC / HS / 0200  - DM labs - GERI ab, islet ab -pending - will not change tx.  - Hypoglycemia protocol    - Carb consistent diet with counting protocol recommended    Discussion:   Patient reports feeling much better this morning. Tolerated breakfast with minimal nausea. Able to verbalize insulin teaching from previous days as well as carb counting. Motivated to adjust diet, exercise more and shows enragement with diabetes plan. Discussed risks of hypoglycemia with high insulin dosing as well as DKA should he become ill. Advised and educated on use of ketone strips. Follow up request placed for [facility] given high insulin dosing and new diagnosis of diabetes.     Inpatient Diabetes Service Recommendations for Discharge:    Blood Glucose Checks: Three times  daily before meals, and at bedtime. . Start using the Dexcom G7 Sensor- download jimmie on your phone and follow steps to connect.     Medications:   Long Acting Insulin   Semglee 100 units at 8 AM   Semglee 80 units at 8 PM    If your blood sugar is <100 (fasting) in the morning reduce the morning dose by 20 units  If your blood sugar is <70 overnight, cut the morning dose in half or reduce by 40-50 units.   If your blood sugars during the day are <100, consider reducing the evening dose of Lantus by 20-30 units  If you continue to experience low blood sugars after these dose reductions, STOP one of the Lantus doses and follow up ASAP.    Short Acting Meal Insulin:    Lispro 1 unit per 3 grams of carbohydrate three times daily with meals plus correction scale:   Novolog correction scale three times a day before meals and at bedtime. Do not take more frequently than every 4 hours. See scale below:       Correction Scale - VERY HIGH INSULIN RESISTANCE DOSING      Do Not give Correction Insulin if fasting BG less than 140.    For  - 149 give 1 unit.    For  - 159 give 2 units.    For  - 169 give 3 units.    For  - 179 give 4 units.    For  - 189 give 5 units.    For  - 199 give 6 units.    For  - 209 give 7 units.    For  - 219 give 8 units.    For  - 229 give 9 units.    For  - 239 give 10 units.    For  - 249 give 11 units.    For  - 259 give 12 units.    For  - 269 give 13 units.    For  - 279 give 14 units.    For  - 289 give 15 units.    For  - 299 give 16 units.    For  - 309 give 17 units.    For  - 319 give 18 units.    For  - 329 give 19 units.    For  - 339 give 20 units.    For  - 349 give 21 units.    For BG greater than or equal 350 give 22 units.    To be given with prandial insulin, and based on fasting blood glucose.      Take this scale at bedtime     Correction Scale - VERY HIGH  INSULIN RESISTANCE DOSING      Do Not give Bedtime Correction Insulin if BG less than 200.    For  - 209 give 1 units.    For  - 219 give 2 units.    For  - 229 give 3 units.    For  - 239 give 4 units.    For  - 249 give 5 units.    For  - 259 give 6 units.    For  - 269 give 7 units.    For  - 279 give 8 units.    For  - 289 give 9 units.    For  - 299 give 10 units.    For  - 309 give 11 units.    For  - 319 give 12 units.    For  - 329 give 13 units.    For  - 339 give 14 units.    For  - 349 give 15 units.    For BG greater than or equal to 350 give 16 units.          Diabetes Outpatient Follow-up: Follow up with  your PCP,  in 1-2 weeks after discharge for your diabetes.  Follow up with [facility] in 1 weeks after discharge. An appointment request was sent to the [facility] coordinator to schedule your outpatient diabetes appointment. Please call the clinic at [phone] if you do not hear from them after discharge, or if you have non-urgent questions regarding your blood sugars or insulin. Follow up sooner if blood glucose runs consistently greater than 180 mg/dL or any episodes less than 70 mg/dL.       Hypoglycemia (Low Blood Glucose) Management:  If blood glucose is 51 to 69:   Eat or drink 15 grams of carbohydrate. Examples:   1/2 cup (4 ounces) apple juice or regular soda pop, or   1 cup (8 ounces) milk, or   15 skittles, or   3 to 4 glucose tablets.   Re-check your blood glucose in 15 minutes.   Repeat these steps every 15 minutes until your blood glucose is above 80.       If blood glucose is 50 or less:   Eat or drink 30 grams of carbohydrate. Examples:   1 cup (8 ounces) apple juice or regular soda pop, or   2 cups (16 ounces) milk, or   1 banana, or   6 to 8 glucose tablets.   Re-check your blood glucose in 15 minutes.   Repeat these steps every 15 minutes until your blood glucose  is above 80.     Thank you for letting the Diabetes Management Team be involved in your care!     Instructions to patient were posted in AVS and discussed     Primary Team to order Medications and Supplies:   Medications and supplies are to be ordered by primary service on discharge.   One Touch Verio Glucose Meter  One Touch Verio Test Strips  One Touch Delica Lancets  Sharps Container  Alcohol Wipes  B-D 4 mm javi pen needles  Semglee (glargine) pens  Insulin lispro kwikpens  Dexcom G7 sensors - [place] approved 10/15  *please use the DIAB non-branded discharge supply order set ([id number])* If there are problems or issues with ordering for discharge, you may contact the pharmacist directly for assistance          Interval History/Review of Systems :   The last 24 hours progress and nursing notes reviewed.  Feeling well this morning. Tolerated breakfast with minimal nausea.     Met with educators and dietician. Feeling motivated to look at his diet and engage in diabetes management. Discussed strategies for lowering carb intake especially as he tends to eat one large meal per day, looking to balance this better.     Validated his efforts in undertaking all of this learning as it is a lot of change!     We discussed risks of hypoglycemia given large insulin doses. Reviewed parameters for adjusting glargine if he has lows for safety. Urgent follow up request placed given high insulin needs and new diagnosis of DM. Patient verbalizes understanding. We also reviewed use of ketone strips due to presentation in DKA. And when to return to hospital should issues arise.     Planned Procedures/Surgeries: none    Inpatient Glucose Control:       Recent Labs   Lab 10/17/24  0147 10/16/24  2249 10/16/24  2157 10/16/24  1853 10/16/24  1732 10/16/24  1620   * 160* 181* 134* 143* 159*             Medications Impacting Glycemia:   Steroids:  no   D5W-containing solutions/medications: none   Other medications impacting glucose:  "non        Nutrition:   Orders Placed This Encounter      Moderate Consistent Carb (60 g CHO per Meal) Diet    Supplements: none   TF: none   TPN: none         Diabetes History: see full consult note for complete diabetes history   Diabetes Type and Duration: New diagnosis - A1c 10/14/2024: 12.0%     PTA Medication Regimen: n/a  Historical Diabetes Medications:     11/21/2018:  4.8%     Glucose monitoring device and frequency: n/a  Outpatient Diabetes Provider: n/a  Formal Diabetes Education/Educator: n/a        Physical Exam:   BP (!) 145/77 (BP Location: Right arm)   Pulse 80   Temp 98.3  F (36.8  C) (Oral)   Resp 18   Ht 1.778 m (5' 10\")   Wt (!) 178.3 kg (393 lb 1.6 oz)   SpO2 97%   BMI 56.40 kg/m    General Appearance: No acute distress, resting comfortably  Alert and interactive   HEENT: Normocephalic, atraumatic.    Lungs:  Breathing comfortably  Abdomen: Round, nondistended.  Extremities:  No significant  lower extremity edema. Fluid movement of extremities.  CMS intact.    Skin:  Warm and dry.   Neuro:  Oriented x3, able to speak clearly.    Psych:  Calm, appropriate mood and affect.           Data:     Lab Results   Component Value Date    A1C 12.0 (H) 10/14/2024       ROUTINE IP LABS (Last four results)  BMP  Recent Labs   Lab 10/17/24  0147 10/16/24  2249 10/16/24  2157 10/16/24  1853 10/16/24  1732 10/16/24  1620 10/16/24  1532 10/16/24  1335 10/15/24  2324 10/15/24  2205 10/15/24  1831 10/15/24  1813   NA  --  135  --   --   --  137  --  137  --   --   --  137   POTASSIUM  --  3.5  --   --   --  3.9  --  3.4  --  3.7  --  4.8   CHLORIDE  --  102  --   --   --  107  --  106  --   --   --  111*   ABDIRASHID  --  8.2*  --   --   --  8.5*  --  8.8  --   --   --  8.6*   CO2  --  17*  --   --   --  15*  --  16*  --   --   --  11*   BUN  --  4.8*  --   --   --  3.9*  --  4.3*  --   --   --  6.9   CR  --  0.67  --   --   --  0.57*  --  0.60*  --   --   --  0.67   * 160* 181* 134*   < > 159*   < > 179*   " < >  --    < > 134*    < > = values in this interval not displayed.     CBC  Recent Labs   Lab 10/15/24  0654 10/14/24  1633   WBC 6.1 7.4   RBC 4.82 5.01   HGB 13.8 15.0   HCT 42.4 44.4   MCV 88 89   MCH 28.6 29.9   MCHC 32.5 33.8   RDW 14.3 14.1    186       Inpatient Diabetes Service will continue to follow, please don't hesitate to contact the team with any questions or concerns.     AUBREY Aguiar, CNP   Inpatient Diabetes Management Service   Pager: [phone]   Available on Vocera      Plan discussed with patient, bedside RN, and primary team via this note.    To contact Inpatient Diabetes Service:     7 AM - 5 PM: Page the [employer] MARIANNA following the patient that day (see filed or incomplete progress notes/consult notes under Endocrinology)    OR if uncertain of provider assignment: page job code [phone]    5 PM - 7 AM: First call after hours is to primary service.    For urgent after-hours questions, page job code for on call fellow: [phone]     I spent a total of 60 minutes on the date of the encounter doing prep/post-work, chart review, history and exam, documentation and further activities per the note including lab review, multidisciplinary communication, counseling the patient and/or coordinating care regarding acute hyper/hypoglycemic management, as well as discharge management and planning/communication.

## 2024-10-17 NOTE — TELEPHONE ENCOUNTER
his patient was seen in hospital for evaluation of blurry vision in setting of new diagnosis of diabetes. Blurry vision resolved with correction of hyperglycemia. He should establish care with optometry in our clinic for VTD. He should not be scheduled in the acute clinic.       --      Above per on call eye provider.      Note to  to assist in next available with Optometrist.    CEE/diabetic eye exam    New adult eye with Dr. Marc, Dr. Norwood, Dr. Lewis at [facility] or Dr. Simpson or Dr. Sanchez at [facility] location.    Reid Lares RN 3:37 PM 10/17/24

## 2024-10-17 NOTE — PROGRESS NOTES
Shift 7786-3025    Goal Outcome Evaluation:       Plan of Care Reviewed With: patient     Overall Patient Progress: no change     Outcome Evaluation:     VS: Check Flowsheets.   O2: Stable on RA. Denies chest pain and SOB.    Output: Voids spontaneously without difficulty to bathroom.    Last BM: 10/16 LBM   Activity: Independent in room.    Skin: WDL except wound and redness to the groin.   Pain: Denies any pain.   CMS: Intact, AOx4. Denies numbness and tingling.   Dressing: N/A.   Diet: 60 gram carb diet  Denies nausea/vomiting.   LDA: R PIV SL and patent.   Equipment: IV pole, personal belongings, and call light in reach.    Plan: Continue with plan of care. Call light within reach, pt able to make needs known.    Additional Info: Mag, phos, and pot replacement. No acute issues overnight.

## 2024-10-17 NOTE — PROGRESS NOTES
[place] INS[place]AF[place] VOUCHER OBTAINED    Medication: SEMGLEE (YFGN) 100 UNIT/ML SC SOPN  Sponsor: Danny/Isabella  Member ID: [id number]  BIN: [id number]  PCN: [id number]  Group: [id number]  Expected Copay: $ 0  Copay card Monthly Max Amount: $ 520.34  Copay card Annual Amount: $ 520.34  Comments: PA still pending for quantity limits. [place] Ins[place] Safety [place] Vou[place] applied (limit one per calendar year).        Sherry Ramirez CP  Discharge Pharmacy Liaison  [facility]  Pronouns: She/Her/Hers    Securely message with [email or web address], Epic Secure Chat, or DivvyCloud  Phone: [phone]  Fax: [phone]  [email or web address]

## 2024-10-17 NOTE — PLAN OF CARE
"  Problem: Adult Inpatient Plan of Care  Goal: Plan of Care Review  Description: The Plan of Care Review/Shift note should be completed every shift.  The Outcome Evaluation is a brief statement about your assessment that the patient is improving, declining, or no change.  This information will be displayed automatically on your shift  note.  Outcome: Adequate for Care Transition  Goal: Patient-Specific Goal (Individualized)  Description: You can add care plan individualizations to a care plan. Examples of Individualization might be:  \"Parent requests to be called daily at 9am for status\", \"I have a hard time hearing out of my right ear\", or \"Do not touch me to wake me up as it startles  me\".  Outcome: Adequate for Care Transition  Goal: Absence of Hospital-Acquired Illness or Injury  Outcome: Adequate for Care Transition  Intervention: Identify and Manage Fall Risk  Recent Flowsheet Documentation  Taken 10/17/2024 0948 by Elvia Novoa RN  Safety Promotion/Fall Prevention:   room near nurse's station   safety round/check completed   nonskid shoes/slippers when out of bed   increase visualization of patient   increased rounding and observation  Intervention: Prevent Infection  Recent Flowsheet Documentation  Taken 10/17/2024 0948 by Elvia Novoa RN  Infection Prevention: hand hygiene promoted  Goal: Optimal Comfort and Wellbeing  Outcome: Adequate for Care Transition  Goal: Readiness for Transition of Care  Outcome: Adequate for Care Transition     Problem: Diabetes  Goal: Blood Glucose Level Within Target Range  Outcome: Adequate for Care Transition     Problem: Skin or Soft Tissue Infection  Goal: Absence of Infection Signs and Symptoms  Outcome: Adequate for Care Transition  Intervention: Minimize and Manage Infection Progression  Recent Flowsheet Documentation  Taken 10/17/2024 0948 by Elvia Novoa RN  Infection Prevention: hand hygiene promoted   Goal Outcome Evaluation:                        "

## 2024-10-17 NOTE — DISCHARGE SUMMARY
[employer]  Hospitalist Discharge Summary      Date of Admission:  10/14/2024  Date of Discharge:  10/17/2024  Discharging Provider: Polina Hoffman MD  Discharge Service: Hospitalist Service, GOLD TEAM 19    Discharge Diagnoses   1.  Newly diagnosed diabetes mellitus with DKA  2.  Penile erythema, discomfort and perineal abscesses  3.  Acute constipation, resolved  4.  Morbid obesity  5.  Hypophosphatemia  6.  Hypokalemia    Follow-ups Needed After Discharge   Follow-up Appointments    Follow Up ([facility])     Establish care with PCP and follow up within 1-2 weeks.     Follow up with Endocrinology within 1 week of discharge. Ok to use POA     Appointments on [place] and/or [facility] (with [facility] or [facility]   provider or service). Call [phone] if you haven't heard regarding   these appointments within 7 days of discharge.         Unresulted Labs Ordered in the Past 30 Days of this Admission       Date and Time Order Name Status Description           10/15/2024  1:07 PM Islet Antigen (IA-2) Autoantibody, Serum In process     10/15/2024  1:07 PM Glutamic acid decarboxylase antibody In process         These results will be followed up by  Hospitalist service    Discharge Disposition   Discharged to home  Condition at discharge: Stable    Hospital Course   Mr. Alirio Nolan is a pleasant 39 yo male with hx of multiple sclerosis and morbid obesity.  He was admitted to [facility] on 10/14 after presenting to the ED with polydipsia, polyuria, blurry vision, constipation and boils in his groin area.  Workup revealed new diagnosis DM with DKA.      Newly diagnosis DM with DKA  ---   Has a family history of diabetes  ---   Presented to the ED with ~ 1 wk h/o polydipsia, polyuria, blurry vision (mainly L eye) and fatigue.   ---   Initial glucose was 286, CO3 9, AG 24, pH 7.17, serum ketones 7.16, and Hgba1c was 12.0% w/ EAG of 298  ---   He was treated w/ insulin drip and  lytes replacement  ---   DKA resolved on HD #3  ---   Seen by endocrine consult services, appreciate recommendations  ---   Pt will discharge to home w/ Lantus 100 units qam and 80 units qpm  ---   He will also discharge w/ Novolog 1 units per 3 g carb and high intensity Novolog sliding scale insulin tid w/ meals and qhs.  ---   Dexcom 6 sensor also ordered for the pt  ---   Glutamic acid decarboxylase antibody and Islet Antigen (IA-2) Autoantibody to distinguish between type 1 or 2 results pending   ---   Follow up with PCP and endocrine clinic in 1-2 weeks     Left eye blurry vision  ---   Was due to hyperglycemia  ---   Pt has underlying MS thus ophthalmology service consulted for further evaluation  ---   Evaluated by Ophthalmology and their assessment is that pt's blurry vision is due to hyperglycemia-induced lens swelling in setting of DKA  ---   Blurry vision resolved with correction of hyperglycemia     Oral and penile erythema and discomfort  Perineal abscesses  ---   Sxs present for the past 1.5 weeks.   ---   States wife has yeast infection, of which similar sxs developed on his penis, and now feels tongue discomfort with dry mouth.   ---   Also with painful opened boils in groin regon that was draining.  ---   Denied fever and chills.   ---   No leukocytosis but had minimally elevated CRP at 18.9.   ---   S/p Keflex and Bactrim 10/14 - 10/17  ---   S/p oral and topical nystatin  ---   Appreciate WOCN rec  ---   He will discharge to home with 2 more days of bactrim and Keflex     Acute constipation  ---   Abd xray at admit with moderate stool burden otherwise unremarkable.   ---   Treated w/ Miralax, Senokot-S and dulcolax pr with complete resolution      Multiple sclerosis  ---   Diagnosed ~ 10 yrs ago after developing BLE paresthesia  ---   Chronically managed on Copaxone 3x weekly, continue      Hypophosphatemia  Hypokalemia  ---   Replaced per protocol    Morbid obesity w/ BMI 55.24 kg/m2  ---   Wt  loss recommended  ---   Pt is an excellent candidate for Ozempic or Moungaro  ---   F/u at Endocrine clinic in 1-2 weeks for discussion of these meds        Consultations This Hospital Stay   ENDOCRINE DIABETES ADULT IP CONSULT  OPHTHALMOLOGY IP CONSULT  DIABETES EDUCATION IP CONSULT  NUTRITION SERVICES ADULT IP CONSULT  WOUND OSTOMY CONTINENCE NURSE  IP CONSULT  PHARMACY LIAISON FOR MEDICATION COVERAGE CONSULT  PHARMACY LIAISON FOR MEDICATION COVERAGE CONSULT  NUTRITION SERVICES ADULT IP CONSULT  PHARMACY LIAISON FOR MEDICATION COVERAGE CONSULT    Code Status   Full Code    Time Spent on this Encounter   I, Polina Hoffman MD, personally saw the patient today and spent greater than 30 minutes discharging this patient.       Polina Hoffman MD  [facility] MED SURG  [facility] [address]  Phone: [phone]  Fax: [phone]  ______________________________________________________________________    Physical Exam   Vital Signs: Temp: 98  F (36.7  C) Temp src: Oral BP: 131/82 Pulse: 73   Resp: 18 SpO2: 96 % O2 Device: None (Room air)    Weight: 385 lbs 0 oz  General: Morbidly obese, aao x 3, NAD.  HEENT:  NC/AT, PERRL, EOMI, neck supple, no thyromegaly, op clear, mmm.  CVS:  NL s 1 and s2, no m/r/g.  Lungs:  CTA B/L.   Abd:  Soft, + bs, NT, no rebound or gaurding, no fluid shift.  Ext:  No c/c.  Lymph:  No edema.  Neuro:  Nonfocal.  Musculoskeletal: No calf tenderness to palpation.    Skin:      Psychiatry:  Mood and affect appropriate.         Primary Care Physician   Physician No Ref-Primary    Discharge Orders      Adult Endocrinology  Referral      Follow Up ([email or web address])    Establish care with PCP and follow up within 1-2 weeks.     Follow up with Endocrinology within 1 week of discharge. Ok to use POA     Appointments on [place] and/or [facility] (with [email or web address] or [facility] provider or service). Call [phone] if you haven't heard regarding these appointments within  7 days of discharge.     Reason for your hospital stay    Newly diagnosed T2DM with DKA     Activity    Your activity upon discharge: activity as tolerated     Discharge Instructions    Inpatient Diabetes Service Recommendations for Discharge:     Blood Glucose Checks: Three times daily before meals, and at bedtime. . Start using the Dexcom G7 Sensor- download jimmie on your phone and follow steps to connect.      Medications:     Long Acting Insulin     Semglee 100 units at 8 AM     Semglee 80 units at 8 PM       If your blood sugar is <100 (fasting) in the morning reduce the morning dose by 20 units    If your blood sugar is <70 overnight, cut the morning dose in half or reduce by 40-50 units.     If your blood sugars during the day are <100, consider reducing the evening dose of Lantus by 20-30 units    If you continue to experience low blood sugars after these dose reductions, STOP one of the Lantus doses and follow up ASAP.       Short Acting Meal Insulin:      Lispro 1 unit per 3 grams of carbohydrate three times daily with meals plus correction scale:     Novolog correction scale three times a day before meals and at bedtime. Do not take more frequently than every 4 hours. See scale below:           Correction Scale - VERY HIGH INSULIN RESISTANCE DOSING     Do Not give Correction Insulin if fasting BG less than 140.   For  - 149 give 1 unit.   For  - 159 give 2 units.   For  - 169 give 3 units.   For  - 179 give 4 units.   For  - 189 give 5 units.   For  - 199 give 6 units.   For  - 209 give 7 units.   For  - 219 give 8 units.   For  - 229 give 9 units.   For  - 239 give 10 units.   For  - 249 give 11 units.   For  - 259 give 12 units.   For  - 269 give 13 units.   For  - 279 give 14 units.   For  - 289 give 15 units.   For  - 299 give 16 units.   For  - 309 give 17 units.   For  - 319 give 18 units.   For   - 329 give 19 units.   For  - 339 give 20 units.   For  - 349 give 21 units.   For BG greater than or equal 350 give 22 units.   To be given with prandial insulin, and based on fasting blood glucose.      Take this scale at bedtime        Correction Scale - VERY HIGH INSULIN RESISTANCE DOSING     Do Not give Bedtime Correction Insulin if BG less than 200.   For  - 209 give 1 units.   For  - 219 give 2 units.   For  - 229 give 3 units.   For  - 239 give 4 units.   For  - 249 give 5 units.   For  - 259 give 6 units.   For  - 269 give 7 units.   For  - 279 give 8 units.   For  - 289 give 9 units.   For  - 299 give 10 units.   For  - 309 give 11 units.   For  - 319 give 12 units.   For  - 329 give 13 units.   For  - 339 give 14 units.   For  - 349 give 15 units.   For BG greater than or equal to 350 give 16 units.     Diet    Follow this diet upon discharge:  Moderate Consistent Carb (60 g CHO per Meal) Diet       Discharge Medications   Current Discharge Medication List        START taking these medications    Details   cephALEXin (KEFLEX) 500 MG capsule Take 1 capsule (500 mg) by mouth every 6 hours.  Qty: 8 capsule, Refills: 0    Associated Diagnoses: Other inflammatory disorders of penis; Boil      Continuous Glucose Sensor (DEXCOM G6 SENSOR) MISC Change every 10 days.  Qty: 3 each, Refills: 5    Associated Diagnoses: Type 2 diabetes mellitus without complication, unspecified whether long term insulin use (H)      !! insulin aspart (NOVOLOG PEN) 100 UNIT/ML pen Inject 1-22 Units subcutaneously 3 times daily (before meals).  Qty: 15 mL, Refills: 1    Associated Diagnoses: Type 2 diabetes mellitus without complication, unspecified whether long term insulin use (H)      !! insulin aspart (NOVOLOG PEN) 100 UNIT/ML pen 1 unit per 3 g carb tid before meals  Qty: 15 mL, Refills: 1    Associated Diagnoses: Type 2  diabetes mellitus without complication, unspecified whether long term insulin use (H)      !! insulin aspart (NOVOLOG PEN) 100 UNIT/ML pen Inject 1-16 Units subcutaneously at bedtime.  Qty: 15 mL, Refills: 1    Associated Diagnoses: Type 2 diabetes mellitus without complication, unspecified whether long term insulin use (H)      !! insulin glargine (LANTUS PEN) 100 UNIT/ML pen Inject 80 Units subcutaneously every evening. Please take it at 7 pm every night  Qty: 15 mL, Refills: 1    Comments: If Lantus is not covered by insurance, may substitute Basaglar or Semglee or other insulin glargine product per insurance preference at same dose and frequency.    Associated Diagnoses: Type 2 diabetes mellitus without complication, unspecified whether long term insulin use (H)      !! insulin glargine (LANTUS PEN) 100 UNIT/ML pen Inject 100 Units subcutaneously every 24 hours. Please take it at 8:00 ashlyn day  Qty: 15 mL, Refills: 1    Comments: If Lantus is not covered by insurance, may substitute Basaglar or Semglee or other insulin glargine product per insurance preference at same dose and frequency.    Associated Diagnoses: Type 2 diabetes mellitus without complication, unspecified whether long term insulin use (H)      sulfamethoxazole-trimethoprim (BACTRIM DS) 800-160 MG tablet Take 2 tablets by mouth 2 times daily.  Qty: 4 tablet, Refills: 0    Associated Diagnoses: Other inflammatory disorders of penis; Boil       !! - Potential duplicate medications found. Please discuss with provider.        CONTINUE these medications which have NOT CHANGED    Details   escitalopram (LEXAPRO) 20 MG tablet Take 20 mg by mouth daily.      glatiramer acetate 40 MG/ML injection Inject 40 mg subcutaneously Every Mon, Wed, Fri Morning.           Allergies   No Known Allergies

## 2024-10-17 NOTE — PROGRESS NOTES
6MS DISCHARGE    D: Patient discharged to home  1730.    I: Discharge prescriptions given to patient. All discharge medications and instructions reviewed with pt.Patient instructed to seek care if experiencing worsening symptoms, such as pain Other phone numbers to call with questions or concerns after discharge reviewed. IV'S removed. Education completed.    A: Pt verbalized understanding of discharge medications and instructions. Prescribed home medications given to patient.  Belongings returned to patient.    P: Patient to follow-up on with primary care            Patient returning call to go over test results.

## 2024-10-17 NOTE — PROGRESS NOTES
Prior Authorization **INITIATED**    Medication: SEMGLEE (YFGN) 100 UNIT/ML SC SOPN  Insurance Company: [employer] - Phone [phone] Fax [phone]  Pharmacy Filling the Rx: [facility] - [place], [place] - [address]  Filling Pharmacy Phone: [phone]  Filling Pharmacy Fax: [phone]  Start Date: 10/17/2024  Reference #: CoverMyMeds Key: [id number] - PA Case ID #: [id number]  Comments:  Plan limits to 30ml per 28 days--PA required as current dosing is 30ml per 17 days.      Sherry Ramirez [facility]  Discharge Pharmacy Liaison  [facility] Discharge Pharmacy  Pronouns: She/Her/Hers    Securely message with [employer], Epic Secure Chat, or [employer]  Phone: [phone]  Fax: [phone]  [email or web address]

## 2024-10-18 ENCOUNTER — TELEPHONE (OUTPATIENT)
Dept: OPHTHALMOLOGY | Facility: CLINIC | Age: 39
End: 2024-10-18
Payer: COMMERCIAL

## 2024-10-18 NOTE — TELEPHONE ENCOUNTER
LVM for patient to call back to schedule an  appointment New adult eye with Dr. Marc, Dr. Norwood, Dr. Lewis at [facility] or Dr. Simpson or [place] [facility] [place] Please add to notes( CEE/diabetic eye exam)...ls10/18/24 per in basket

## 2024-10-18 NOTE — PROGRESS NOTES
Prior Authorization **APPROVED**    Authorization Effective Date: 10/17/2024  Authorization Expiration Date: 10/16/2025  Medication: SEMGLEE (YFGN) 100 UNIT/ML SC SOPN  Approved Dose/Quantity: 184u per day  Reference #: CoverMyMeds Key: [id number] - PA Case ID #: [id number]   Insurance Company: [employer] - Phone [phone] Fax [phone]  Expected CoPay: $ 5    CoPay Card Available: No    Foundation Assistance Needed: -  Which Pharmacy is filling the prescription (Not needed for infusion/clinic administered): [facility] - [place], [place] - [address]  Pharmacy Notified: Yes  Patient Notified: Yes  Comments:  Plan limits to 30ml per 28 days--PA required as current dosing is 30ml per 17 days.            Sherry Ramirez [facility]  Discharge Pharmacy Liaison  [facility] Discharge Pharmacy  Pronouns: She/Her/Hers    Securely message with Vocera, Epic Secure Chat, or [employer]  Phone: [phone]  Fax: [phone]  [email or web address]

## 2024-10-21 ENCOUNTER — TELEPHONE (OUTPATIENT)
Dept: OPHTHALMOLOGY | Facility: CLINIC | Age: 39
End: 2024-10-21
Payer: COMMERCIAL

## 2024-10-21 NOTE — TELEPHONE ENCOUNTER
Per pedro shaw to sched New adult eye with Dr. Rain, Dr. Norwood, Dr. Lewis at [facility] or Dr. Simpson or [place] [facility] [place] Please add to notes( CEE/diabetic eye exam)...jam

## 2024-10-23 ENCOUNTER — OFFICE VISIT (OUTPATIENT)
Dept: OPTOMETRY | Facility: CLINIC | Age: 39
End: 2024-10-23
Payer: COMMERCIAL

## 2024-10-23 DIAGNOSIS — H54.62 DECREASED VISION OF LEFT EYE: ICD-10-CM

## 2024-10-23 DIAGNOSIS — H52.03 HYPEROPIA, BILATERAL: ICD-10-CM

## 2024-10-23 DIAGNOSIS — E11.311 TYPE 2 DIABETES MELLITUS WITH MACULAR EDEMA (H): Primary | ICD-10-CM

## 2024-10-23 PROCEDURE — 92004 COMPRE OPH EXAM NEW PT 1/>: CPT

## 2024-10-23 PROCEDURE — 92015 DETERMINE REFRACTIVE STATE: CPT

## 2024-10-23 ASSESSMENT — REFRACTION_MANIFEST
OD_AXIS: 088
OS_AXIS: 086
OD_CYLINDER: +0.50
OS_SPHERE: +1.50
OS_SPHERE: +1.25
OS_CYLINDER: +1.00
METHOD_AUTOREFRACTION: 1
OS_CYLINDER: +0.50
OD_AXIS: 089
OD_CYLINDER: +0.75
OS_AXIS: 086
OD_SPHERE: +2.00
OD_SPHERE: +1.00

## 2024-10-23 ASSESSMENT — KERATOMETRY
OS_K2POWER_DIOPTERS: 43.25
OD_AXISANGLE_DEGREES: 83
OS_AXISANGLE_DEGREES: 80
OD_K2POWER_DIOPTERS: 43.50
OS_K1POWER_DIOPTERS: 41.50
OS_AXISANGLE2_DEGREES: 170
OD_K1POWER_DIOPTERS: 41.75
OD_AXISANGLE2_DEGREES: 173

## 2024-10-23 ASSESSMENT — EXTERNAL EXAM - RIGHT EYE: OD_EXAM: NORMAL

## 2024-10-23 ASSESSMENT — CONF VISUAL FIELD
OS_SUPERIOR_NASAL_RESTRICTION: 0
OS_INFERIOR_NASAL_RESTRICTION: 0
OD_SUPERIOR_TEMPORAL_RESTRICTION: 0
OS_SUPERIOR_TEMPORAL_RESTRICTION: 0
OD_SUPERIOR_NASAL_RESTRICTION: 0
OD_NORMAL: 1
OD_INFERIOR_TEMPORAL_RESTRICTION: 0
OS_INFERIOR_TEMPORAL_RESTRICTION: 0
OS_NORMAL: 1
OD_INFERIOR_NASAL_RESTRICTION: 0

## 2024-10-23 ASSESSMENT — VISUAL ACUITY
OD_SC+: -2
METHOD: SNELLEN - LINEAR
OD_SC: 20/20
OD_CC: 20/20
CORRECTION_TYPE: GLASSES
OS_SC: 20/50
OS_PH_SC: 20/40
OS_CC: 20/70
OS_SC+: -1

## 2024-10-23 ASSESSMENT — CUP TO DISC RATIO
OS_RATIO: 0.05
OD_RATIO: 0.05

## 2024-10-23 ASSESSMENT — SLIT LAMP EXAM - LIDS
COMMENTS: NORMAL
COMMENTS: NORMAL

## 2024-10-23 ASSESSMENT — REFRACTION_WEARINGRX
OD_SPHERE: +1.25
OS_SPHERE: +1.25
SPECS_TYPE: OTC READERS

## 2024-10-23 ASSESSMENT — TONOMETRY
OS_IOP_MMHG: 19
IOP_METHOD: TONOPEN
OD_IOP_MMHG: 22

## 2024-10-23 ASSESSMENT — EXTERNAL EXAM - LEFT EYE: OS_EXAM: NORMAL

## 2024-10-23 NOTE — PROGRESS NOTES
Chief Complaint   Patient presents with    Diabetic Eye Exam        Chief Complaint(s) and History of Present Illness(es)       Diabetic Eye Exam              Diabetes Type: Type 2 and on insulin    Duration: 1 week    Blood Sugars: is controlled                   Lab Results   Component Value Date    A1C 12.0 10/14/2024            Last Eye Exam: 1st eye exam  Dilated Previously: Yes    What are you currently using to see?  Readers +1.25    Distance Vision Acuity: Satisfied with vision- left eye does feel blurred     Near Vision Acuity: Not satisfied especially with left eye     Eye Comfort: good  Do you use eye drops? 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       OBJECTIVE: See Ophthalmology exam    ASSESSMENT:    ICD-10-CM    1. Type 2 diabetes mellitus with macular edema (H)  E11.311       2. Decreased vision of left eye  H54.62       3. Hyperopia, bilateral  H52.03           PLAN:    Alirio Nolan aware  eye exam results will be sent to No Ref-Primary, Physician.  Patient Instructions   T2DM: Patient with BCVA 20/40 left eye, no obvious retinopathy/CME on exam.  Refer for baseline mac OCT/RNFL (given h/o MS, no pain on eye movement).  Full color plates.  Patient educated on condition. Stressed importance of close BS/BP monitoring, diet/exercise, medication compliance, and regular visits with PCP. Monitor annually.   Decreased vision, left eye: See plan 1.  Hyperopia, both eyes: Updated glasses prescription for full-time wear.  Patient aware that glasses prescription will change as blood sugar control improves.  Monitor annually.       Angella Campos, OD

## 2024-10-23 NOTE — PATIENT INSTRUCTIONS
T2DM: Patient with BCVA 20/40 left eye, no obvious retinopathy/CME on exam.  Refer for baseline mac OCT/RNFL (given h/o MS, no pain on eye movement).  Full color plates.  Patient educated on condition. Stressed importance of close BS/BP monitoring, diet/exercise, medication compliance, and regular visits with PCP. Monitor annually.   Decreased vision, left eye: See plan 1.  Hyperopia, both eyes: Updated glasses prescription for full-time wear.  Patient aware that glasses prescription will change as blood sugar control improves.  Monitor annually.

## 2024-10-23 NOTE — LETTER
10/23/2024      Alirio Nolan  [address]  [place] [address]      Dear Colleague,    Thank you for referring your patient, Alirio Nolan, to the [facility]. Please see a copy of my visit note below.    Chief Complaint   Patient presents with     Diabetic Eye Exam        Chief Complaint(s) and History of Present Illness(es)       Diabetic Eye Exam              Diabetes Type: Type 2 and on insulin    Duration: 1 week    Blood Sugars: is controlled                   Lab Results   Component Value Date    A1C 12.0 10/14/2024            Last Eye Exam: 1st eye exam  Dilated Previously: Yes    What are you currently using to see?  Readers +1.25    Distance Vision Acuity: Satisfied with vision- left eye does feel blurred     Near Vision Acuity: Not satisfied especially with left eye     Eye Comfort: good  Do you use eye drops? 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       OBJECTIVE: See Ophthalmology exam    ASSESSMENT:    ICD-10-CM    1. Type 2 diabetes mellitus with macular edema (H)  E11.311       2. Decreased vision of left eye  H54.62       3. Hyperopia, bilateral  H52.03           PLAN:    Alirio Nolan aware  eye exam results will be sent to No Ref-Primary, Physician.  Patient Instructions   T2DM: Patient with BCVA 20/40 left eye, no obvious retinopathy/CME on exam.  Refer for baseline mac OCT/RNFL (given h/o MS, no pain on eye movement).  Full color plates.  Patient educated on condition. Stressed importance of close BS/BP monitoring, diet/exercise, medication compliance, and regular visits with PCP. Monitor annually.   Decreased vision, left eye: See plan 1.  Hyperopia, both eyes: Updated glasses prescription for full-time wear.  Patient aware that glasses prescription will change as blood sugar control improves.  Monitor annually.        Angella Campos, LORI      Again, thank you for allowing me to participate in the care of your patient.        Sincerely,        Angella Campos OD

## 2024-10-31 ENCOUNTER — VIRTUAL VISIT (OUTPATIENT)
Dept: FAMILY MEDICINE | Facility: CLINIC | Age: 39
End: 2024-10-31
Payer: COMMERCIAL

## 2024-10-31 DIAGNOSIS — Z79.4 TYPE 2 DIABETES MELLITUS WITHOUT COMPLICATION, WITH LONG-TERM CURRENT USE OF INSULIN (H): ICD-10-CM

## 2024-10-31 DIAGNOSIS — E11.9 TYPE 2 DIABETES MELLITUS WITHOUT COMPLICATION, WITH LONG-TERM CURRENT USE OF INSULIN (H): ICD-10-CM

## 2024-10-31 DIAGNOSIS — E66.01 MORBID OBESITY (H): ICD-10-CM

## 2024-10-31 DIAGNOSIS — G37.3 TRANSVERSE MYELITIS (H): ICD-10-CM

## 2024-10-31 DIAGNOSIS — F41.9 ANXIETY AND DEPRESSION: ICD-10-CM

## 2024-10-31 DIAGNOSIS — E11.9 TYPE 2 DIABETES MELLITUS WITHOUT COMPLICATION, UNSPECIFIED WHETHER LONG TERM INSULIN USE (H): ICD-10-CM

## 2024-10-31 DIAGNOSIS — G35 MULTIPLE SCLEROSIS (H): ICD-10-CM

## 2024-10-31 DIAGNOSIS — Z09 HOSPITAL DISCHARGE FOLLOW-UP: Primary | ICD-10-CM

## 2024-10-31 DIAGNOSIS — F32.A ANXIETY AND DEPRESSION: ICD-10-CM

## 2024-10-31 PROBLEM — E11.10 DIABETIC KETOACIDOSIS WITHOUT COMA ASSOCIATED WITH TYPE 2 DIABETES MELLITUS (H): Status: RESOLVED | Noted: 2024-10-14 | Resolved: 2024-10-31

## 2024-10-31 PROCEDURE — 99204 OFFICE O/P NEW MOD 45 MIN: CPT | Mod: 95 | Performed by: PHYSICIAN ASSISTANT

## 2024-10-31 PROCEDURE — G2211 COMPLEX E/M VISIT ADD ON: HCPCS | Mod: 95 | Performed by: PHYSICIAN ASSISTANT

## 2024-10-31 RX ORDER — TIRZEPATIDE 2.5 MG/.5ML
2.5 INJECTION, SOLUTION SUBCUTANEOUS
Qty: 2 ML | Refills: 0 | Status: SHIPPED | OUTPATIENT
Start: 2024-10-31

## 2024-10-31 RX ORDER — TIRZEPATIDE 5 MG/.5ML
5 INJECTION, SOLUTION SUBCUTANEOUS
Qty: 2 ML | Refills: 0 | Status: SHIPPED | OUTPATIENT
Start: 2024-10-31

## 2024-10-31 RX ORDER — TIRZEPATIDE 7.5 MG/.5ML
7.5 INJECTION, SOLUTION SUBCUTANEOUS
Qty: 2 ML | Refills: 0 | Status: SHIPPED | OUTPATIENT
Start: 2024-10-31

## 2024-10-31 RX ORDER — ESCITALOPRAM OXALATE 20 MG/1
20 TABLET ORAL DAILY
Qty: 90 TABLET | Refills: 1 | Status: SHIPPED | OUTPATIENT
Start: 2024-10-31

## 2024-10-31 RX ORDER — ACYCLOVIR 400 MG/1
TABLET ORAL
Qty: 3 EACH | Refills: 5 | Status: SHIPPED | OUTPATIENT
Start: 2024-10-31

## 2024-10-31 NOTE — PROGRESS NOTES
Alirio is a 38 year old who is being evaluated via a billable video visit.    How would you like to obtain your AVS? Mail a copy  If the video visit is dropped, the invitation should be resent by: Text to cell phone: [phone]  Will anyone else be joining your video visit? No      Assessment & Plan     Hospital discharge follow-up  Recovering well following hospital discharge.  Plan for 2 to 3-month follow-up in person for labs and reassessment.  He is knowledgeable when it comes to medications, needs endo follow-up.     Type 2 diabetes mellitus without complication, with long-term current use of insulin (H)  Continue same doses of insulin for now.  Refilled Dexcom.  Start Mounjaro 2.5 mg weekly X 1 month gradually increase as tolerated.  Sent additional 5 mg and 7.5 mg pen for him to use consecutively if tolerated well.  Reviewed safety/side effects/expectations.  Knows he will need to adjust his insulin therapy based on this.  Can consider additional oral therapies in the future.  Healthy diet and exercise. Upcoming endo appt.     - MOUNJARO 2.5 MG/0.5ML SOAJ  Dispense: 2 mL; Refill: 0  - MOUNJARO 5 MG/0.5ML SOAJ  Dispense: 2 mL; Refill: 0  - Tirzepatide (MOUNJARO) 7.5 MG/0.5ML SOAJ  Dispense: 2 mL; Refill: 0  - Continuous Glucose Sensor (DEXCOM G7 SENSOR) MISC  Dispense: 3 each; Refill: 5  - insulin aspart (NOVOLOG PEN) 100 UNIT/ML pen  Dispense: 15 mL; Refill: 1  - insulin glargine (LANTUS PEN) 100 UNIT/ML pen  Dispense: 45 mL; Refill: 3    Morbid obesity (H)  Healthy diet and exercise.  Mounjaro as above.    Anxiety and depression  Refilled Lexapro  - escitalopram (LEXAPRO) 20 MG tablet  Dispense: 90 tablet; Refill: 1    Transverse myelitis (H)  Multiple sclerosis (H)  Follows with neurology.  Continue Glatiramer.       30 minutes spent by me on the date of the encounter doing chart review, review of test results, interpretation of tests, patient visit, and documentation     The longitudinal plan of care for  the diagnosis(es)/condition(s) as documented were addressed during this visit. Due to the added complexity in care, I will continue to support Alirio in the subsequent management and with ongoing continuity of care.    MED REC REQUIRED  Post Medication Reconciliation Status: discharge medications reconciled, continue medications without change  Nicotine/Tobacco Cessation  He reports that he has been smoking cigarettes. He has never used smokeless tobacco.  Nicotine/Tobacco Cessation Plan  Information offered: Patient not interested at this time    Subjective   Alirio is a pleasant 38 year old, presenting for the following health issues:  Establish Care and Hospital F/U (Needs med refills)    Here today to establish care as well as for hospital discharge follow-up.  Admitted 7/14/2024 until 10/27/2024 with a new diagnosis of diabetes and DKA.  Islet + glutamic acid both negative, Type II diabetes confirmed. 12.0% A1c.     Currently using NovoLog with meals + Lantus 50 units in the morning and 50 units in the evening.  Average blood sugar 114-125 AM.  Occasional hypoglycemic episodes.  Using Dexcom.  Following low-carb diet.  Would be an excellent candidate for GLP-1 therapy. BMI 55.  Skin issues resolved from hospital.    -History of MS on glatiramer shots 3 times week.     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  Very knowledgeable when it comes to medications.    Hospital Follow-up Visit:    Hospital/Nursing Home/IP Rehab Facility: [facility]  Date of Admission: 10/14/24  Date of Discharge: 10/17/24  Reason(s) for Admission:   1.  Newly diagnosed diabetes mellitus with DKA  2.  Penile erythema, discomfort and perineal abscesses  3.  Acute constipation, resolved  4.  Morbid obesity  5.  Hypophosphatemia  6.  Hypokalemia  Was the patient in the ICU or did the patient experience delirium during hospitalization?  No  Do you  have any other stressors you would like to discuss with your provider? OTHER: refills    Problems taking medications regularly:  None  Medication changes since discharge: None  Problems adhering to non-medication therapy:  None    Summary of hospitalization:  [facility] discharge summary reviewed  Diagnostic Tests/Treatments reviewed.  Follow up needed: ENDO  Other Healthcare Providers Involved in Patient s Care: ENDO/opto           Update since discharge: improved.         Plan of care communicated with patient               Review of Systems  Constitutional, neuro, ENT, endocrine, pulmonary, cardiac, gastrointestinal, genitourinary, musculoskeletal, integument and psychiatric systems are negative, except as otherwise noted.      Objective           Vitals:  No vitals were obtained today due to virtual visit.    Physical Exam   GENERAL: alert and no distress  EYES: Eyes grossly normal to inspection.  No discharge or erythema, or obvious scleral/conjunctival abnormalities.  RESP: No audible wheeze, cough, or visible cyanosis.    SKIN: Visible skin clear. No significant rash, abnormal pigmentation or lesions.  NEURO: Cranial nerves grossly intact.  Mentation and speech appropriate for age.  PSYCH: Appropriate affect, tone, and pace of words          Video-Visit Details    Type of service:  Video Visit   Originating Location (pt. Location): Home    Distant Location (provider location):  On-site  Platform used for Video Visit: Nick  Signed Electronically by: Moe Joiner PA-C

## 2024-11-06 ENCOUNTER — TELEPHONE (OUTPATIENT)
Dept: OPHTHALMOLOGY | Facility: CLINIC | Age: 39
End: 2024-11-06
Payer: COMMERCIAL

## 2024-11-11 ENCOUNTER — OFFICE VISIT (OUTPATIENT)
Dept: OPHTHALMOLOGY | Facility: CLINIC | Age: 39
End: 2024-11-11
Payer: COMMERCIAL

## 2024-11-11 DIAGNOSIS — H54.62 DECREASED VISION OF LEFT EYE: Primary | ICD-10-CM

## 2024-11-11 DIAGNOSIS — G35 MULTIPLE SCLEROSIS (H): ICD-10-CM

## 2024-11-11 ASSESSMENT — CONF VISUAL FIELD
OS_SUPERIOR_TEMPORAL_RESTRICTION: 0
OD_SUPERIOR_TEMPORAL_RESTRICTION: 0
OD_INFERIOR_TEMPORAL_RESTRICTION: 0
OD_INFERIOR_NASAL_RESTRICTION: 0
OS_INFERIOR_NASAL_RESTRICTION: 0
OD_SUPERIOR_NASAL_RESTRICTION: 0
OS_SUPERIOR_NASAL_RESTRICTION: 0
METHOD: COUNTING FINGERS
OS_NORMAL: 1
OS_INFERIOR_TEMPORAL_RESTRICTION: 0
OD_NORMAL: 1

## 2024-11-11 ASSESSMENT — CUP TO DISC RATIO
OD_RATIO: 0.05
OS_RATIO: 0.05

## 2024-11-11 ASSESSMENT — REFRACTION_WEARINGRX
OD_SPHERE: +1.00
OD_CYLINDER: +0.50
OS_SPHERE: +1.50
OS_AXIS: 086
OS_CYLINDER: +0.50
OD_AXIS: 088

## 2024-11-11 ASSESSMENT — EXTERNAL EXAM - LEFT EYE: OS_EXAM: NORMAL

## 2024-11-11 ASSESSMENT — TONOMETRY
OS_IOP_MMHG: 21
IOP_METHOD: TONOPEN
OD_IOP_MMHG: 22

## 2024-11-11 ASSESSMENT — VISUAL ACUITY
OD_SC: J1+
OS_SC: 20/50
OD_SC: 20/15
OS_SC: J3
OS_CC: J2-2
OD_SC+: -3
OS_PH_SC: 20/40
OD_CC: J1+
METHOD: SNELLEN - LINEAR

## 2024-11-11 ASSESSMENT — EXTERNAL EXAM - RIGHT EYE: OD_EXAM: NORMAL

## 2024-11-11 ASSESSMENT — SLIT LAMP EXAM - LIDS
COMMENTS: NORMAL
COMMENTS: NORMAL

## 2024-11-11 NOTE — PROGRESS NOTES
HPI:    Patient was last seen by me on 10/23/24 for a comprehensive eye exam.  At that time, he had decreased vision left eye to 20/40 without diabetic retinopathy/CME on fundus exam.  He had recently been seen in the ED on 10/14/24 for new diagnosis of DM with ketoacidosis and A1c of 12%.  Notably patient with h/o MS, no findings suggestive of optic neuritis on exam.    Since then, left eye continues to feel blurry.  No significant improvement or worsening since onset.  His right eye has improved since our last visit and he has seldom been wearing his glasses.    Last MRI in 2016 per chart review and patient's recollection.    Previously followed with Dr. Bower at [facility] for neurology.  She has retired and he is looking to establish care with a new provider.  Currently on Glatiramer (for 9 years).  Diagnosed following onset of bilateral leg numbness/tingling.  No previous episodes of optic neuritis or pain on eye movement.    He denies new, unusual headaches or other neurologic changes.    Today's Testing:  OCT RNFL:  Right eye: Average RNFL 94 microns.  Borderline nasal thinning.  Left eye: Average RNFL 84 microns.  Temporal RNFL thinning.    Assessment and Plan:  Today, Alirio's visual acuity left eye remains decreased to 20/40 and his pupil exam revealed possible trace left APD.  He denies associated eye pain, headache, or other neurologic changes/symptoms.  His OCT reveals temporal retinal nerve fiber layer thinning in the LEFT eye.  In light of his h/o multiple sclerosis and unexplained vision change, left eye, I will order MRI Brain + Orbits WWO to further evaluate (most recent neuro-imaging in 2016).    Alirio's neurologist recently retired; he continues on glatiramer.  I will refer him to our neurology department for continued management of his MS.    Complete documentation of historical and exam elements from today's encounter can be found in the full encounter summary report (not reduplicated in this  progress note). I personally obtained the chief complaint(s) and history of present illness. I confirmed and edited as necessary the review of systems, past medical/surgical history, family history, social history, and examination findings as document by others; and I examined the patient myself. I personally reviewed the relevant tests, images, and reports as documented above. I formulated and edited as necessary the assessment and plan and discussed the findings and management plan with the patient and family.    Angella Campos, OD

## 2024-11-11 NOTE — NURSING NOTE
Chief Complaints and History of Present Illnesses   Patient presents with    Follow Up     Follow up TBI exam / Baseline Testing Type 2 Diabetes and History of MS     Chief Complaint(s) and History of Present Illness(es)       Follow Up              Laterality: both eyes    Comments: Follow up TBI exam / Baseline Testing Type 2 Diabetes and History of MS              Comments    Lab Results       Component                Value               Date                       A1C                      12.0                10/14/2024            Patient reports that since exam on 10/23/24 did fill the glasses Rx.   Did wear them for a while but does not feel they are needed completely due to improvement since with right eye since last visit. Left eye still has a blurr to it but able to read bigger font.   No eye pain or double vision each eye.     Sabiha Mcgraw, COT COT 2:48 PM November 11, 2024

## 2024-11-12 ENCOUNTER — TELEPHONE (OUTPATIENT)
Dept: FAMILY MEDICINE | Facility: CLINIC | Age: 39
End: 2024-11-12
Payer: COMMERCIAL

## 2024-11-12 DIAGNOSIS — Z79.4 TYPE 2 DIABETES MELLITUS WITHOUT COMPLICATION, WITH LONG-TERM CURRENT USE OF INSULIN (H): Primary | ICD-10-CM

## 2024-11-12 DIAGNOSIS — E11.9 TYPE 2 DIABETES MELLITUS WITHOUT COMPLICATION, WITH LONG-TERM CURRENT USE OF INSULIN (H): Primary | ICD-10-CM

## 2024-11-12 RX ORDER — INSULIN LISPRO 100 [IU]/ML
1-22 INJECTION, SOLUTION INTRAVENOUS; SUBCUTANEOUS
Qty: 15 ML | Refills: 1 | Status: SHIPPED | OUTPATIENT
Start: 2024-11-12

## 2024-11-12 NOTE — TELEPHONE ENCOUNTER
Insurance policy does not cover Novolog-- Pen. Preferred alternative is Humalog-- Pen. New Rx pended for signature if appropriate.    Mitch Gagnon, CMA on 11/12/2024 at 10:07 AM

## 2024-11-21 NOTE — TELEPHONE ENCOUNTER
RECORDS RECEIVED FROM: Internal   REASON FOR VISIT: Decreased vision of left eye  Multiple sclerosis    PROVIDER: MARIANNE Coe   DATE OF APPT: 11-26-24   NOTES (FOR ALL VISITS) STATUS DETAILS   OFFICE NOTE from referring provider Internal 11-11-24 Angella Campos   DISCHARGE REPORT from the ER Internal 10-14-24 [facility]   MEDICATION LIST Internal    IMAGING  (FOR ALL VISITS)     MRI (HEAD, NECK, SPINE) In process MRI Brain 11-4-16  MR Spine 11-4-16      Action Savannah Vu on 11/21/2024      [facility] Records/ Imaging Requested:   Action Taken MR Brain Images  MR Spine Images 11-4-16 11-4-16 and 11-3-16

## 2024-11-26 ENCOUNTER — PRE VISIT (OUTPATIENT)
Dept: NEUROLOGY | Facility: CLINIC | Age: 39
End: 2024-11-26
Payer: COMMERCIAL

## 2024-11-30 ENCOUNTER — HEALTH MAINTENANCE LETTER (OUTPATIENT)
Age: 39
End: 2024-11-30

## 2024-12-14 ENCOUNTER — ANCILLARY PROCEDURE (OUTPATIENT)
Dept: MRI IMAGING | Facility: CLINIC | Age: 39
End: 2024-12-14
Payer: COMMERCIAL

## 2024-12-14 DIAGNOSIS — G35 MULTIPLE SCLEROSIS (H): ICD-10-CM

## 2024-12-14 DIAGNOSIS — H54.62 DECREASED VISION OF LEFT EYE: ICD-10-CM

## 2024-12-14 PROCEDURE — A9585 GADOBUTROL INJECTION: HCPCS | Mod: JZ | Performed by: PREVENTIVE MEDICINE

## 2024-12-14 PROCEDURE — 70553 MRI BRAIN STEM W/O & W/DYE: CPT | Performed by: PREVENTIVE MEDICINE

## 2024-12-14 RX ORDER — GADOBUTROL 604.72 MG/ML
15 INJECTION INTRAVENOUS ONCE
Status: COMPLETED | OUTPATIENT
Start: 2024-12-14 | End: 2024-12-14

## 2024-12-14 RX ORDER — GADOBUTROL 604.72 MG/ML
7.5 INJECTION INTRAVENOUS ONCE
Status: COMPLETED | OUTPATIENT
Start: 2024-12-14 | End: 2024-12-14

## 2024-12-14 RX ADMIN — GADOBUTROL 2.5 ML: 604.72 INJECTION INTRAVENOUS at 09:03

## 2024-12-14 RX ADMIN — GADOBUTROL 15 ML: 604.72 INJECTION INTRAVENOUS at 09:03

## 2024-12-14 NOTE — DISCHARGE INSTRUCTIONS
MRI Contrast Discharge Instructions    The IV contrast you received today will pass out of your body in your  urine. This will happen in the next 24 hours. You will not feel this process.  Your urine will not change color.    Drink at least 4 extra glasses of water or juice today (unless your doctor  has restricted your fluids). This reduces the stress on your kidneys.  You may take your regular medicines.    If you are on dialysis: It is best to have dialysis today.    If you have a reaction: Most reactions happen right away. If you have  any new symptoms after leaving the hospital (such as hives or swelling),  call your hospital at the correct number below. Or call your family doctor.  If you have breathing distress or wheezing, call 911.    Special instructions: ***    I have read and understand the above information.    Signature:______________________________________ Date:___________    Staff:__________________________________________ Date:___________     Time:__________    [facility] Departments:    ___[place]: [phone]  ___[facility]: [phone]  ___[place]: [phone] ___[facility]: [phone]  ___[facility]: [phone]  ___[facility]: [phone]  ___[phone]  ___[facility]: [phone]  ___[phone]

## 2024-12-30 ASSESSMENT — ANXIETY QUESTIONNAIRES
7. FEELING AFRAID AS IF SOMETHING AWFUL MIGHT HAPPEN: SEVERAL DAYS
GAD7 TOTAL SCORE: 4
5. BEING SO RESTLESS THAT IT IS HARD TO SIT STILL: NOT AT ALL
GAD7 TOTAL SCORE: 4
6. BECOMING EASILY ANNOYED OR IRRITABLE: SEVERAL DAYS
4. TROUBLE RELAXING: SEVERAL DAYS
7. FEELING AFRAID AS IF SOMETHING AWFUL MIGHT HAPPEN: SEVERAL DAYS
2. NOT BEING ABLE TO STOP OR CONTROL WORRYING: NOT AT ALL
1. FEELING NERVOUS, ANXIOUS, OR ON EDGE: SEVERAL DAYS
3. WORRYING TOO MUCH ABOUT DIFFERENT THINGS: NOT AT ALL
8. IF YOU CHECKED OFF ANY PROBLEMS, HOW DIFFICULT HAVE THESE MADE IT FOR YOU TO DO YOUR WORK, TAKE CARE OF THINGS AT HOME, OR GET ALONG WITH OTHER PEOPLE?: SOMEWHAT DIFFICULT
GAD7 TOTAL SCORE: 4
IF YOU CHECKED OFF ANY PROBLEMS ON THIS QUESTIONNAIRE, HOW DIFFICULT HAVE THESE PROBLEMS MADE IT FOR YOU TO DO YOUR WORK, TAKE CARE OF THINGS AT HOME, OR GET ALONG WITH OTHER PEOPLE: SOMEWHAT DIFFICULT

## 2024-12-31 ENCOUNTER — OFFICE VISIT (OUTPATIENT)
Dept: FAMILY MEDICINE | Facility: CLINIC | Age: 39
End: 2024-12-31
Payer: COMMERCIAL

## 2024-12-31 VITALS
WEIGHT: 315 LBS | SYSTOLIC BLOOD PRESSURE: 137 MMHG | HEART RATE: 81 BPM | OXYGEN SATURATION: 98 % | RESPIRATION RATE: 16 BRPM | TEMPERATURE: 97.5 F | HEIGHT: 70 IN | DIASTOLIC BLOOD PRESSURE: 83 MMHG | BODY MASS INDEX: 45.1 KG/M2

## 2024-12-31 DIAGNOSIS — G35 MULTIPLE SCLEROSIS (H): ICD-10-CM

## 2024-12-31 DIAGNOSIS — Z71.6 ENCOUNTER FOR SMOKING CESSATION COUNSELING: ICD-10-CM

## 2024-12-31 DIAGNOSIS — F40.243 ANXIETY WITH FLYING: ICD-10-CM

## 2024-12-31 DIAGNOSIS — E66.01 MORBID OBESITY (H): ICD-10-CM

## 2024-12-31 DIAGNOSIS — F41.9 ANXIETY AND DEPRESSION: ICD-10-CM

## 2024-12-31 DIAGNOSIS — E11.9 TYPE 2 DIABETES MELLITUS WITHOUT COMPLICATION, UNSPECIFIED WHETHER LONG TERM INSULIN USE (H): Primary | ICD-10-CM

## 2024-12-31 DIAGNOSIS — F32.A ANXIETY AND DEPRESSION: ICD-10-CM

## 2024-12-31 LAB
CHOLEST SERPL-MCNC: 155 MG/DL
CREAT UR-MCNC: 222 MG/DL
EST. AVERAGE GLUCOSE BLD GHB EST-MCNC: 108 MG/DL
FASTING STATUS PATIENT QL REPORTED: YES
HBA1C MFR BLD: 5.4 % (ref 0–5.6)
HDLC SERPL-MCNC: 28 MG/DL
LDLC SERPL CALC-MCNC: 108 MG/DL
MICROALBUMIN UR-MCNC: 14.3 MG/L
MICROALBUMIN/CREAT UR: 6.44 MG/G CR (ref 0–17)
NONHDLC SERPL-MCNC: 127 MG/DL
TRIGL SERPL-MCNC: 94 MG/DL

## 2024-12-31 PROCEDURE — G2211 COMPLEX E/M VISIT ADD ON: HCPCS | Performed by: PHYSICIAN ASSISTANT

## 2024-12-31 PROCEDURE — 82043 UR ALBUMIN QUANTITATIVE: CPT | Performed by: PHYSICIAN ASSISTANT

## 2024-12-31 PROCEDURE — 99215 OFFICE O/P EST HI 40 MIN: CPT | Performed by: PHYSICIAN ASSISTANT

## 2024-12-31 PROCEDURE — 83036 HEMOGLOBIN GLYCOSYLATED A1C: CPT | Performed by: PHYSICIAN ASSISTANT

## 2024-12-31 PROCEDURE — 82570 ASSAY OF URINE CREATININE: CPT | Performed by: PHYSICIAN ASSISTANT

## 2024-12-31 PROCEDURE — 36415 COLL VENOUS BLD VENIPUNCTURE: CPT | Performed by: PHYSICIAN ASSISTANT

## 2024-12-31 PROCEDURE — 96127 BRIEF EMOTIONAL/BEHAV ASSMT: CPT | Performed by: PHYSICIAN ASSISTANT

## 2024-12-31 PROCEDURE — 80061 LIPID PANEL: CPT | Performed by: PHYSICIAN ASSISTANT

## 2024-12-31 RX ORDER — BUSPIRONE HYDROCHLORIDE 5 MG/1
5 TABLET ORAL 2 TIMES DAILY
Qty: 180 TABLET | Refills: 1 | Status: SHIPPED | OUTPATIENT
Start: 2024-12-31

## 2024-12-31 RX ORDER — LORAZEPAM 0.5 MG/1
TABLET ORAL
Qty: 10 TABLET | Refills: 0 | Status: SHIPPED | OUTPATIENT
Start: 2024-12-31

## 2024-12-31 ASSESSMENT — PAIN SCALES - GENERAL: PAINLEVEL_OUTOF10: MILD PAIN (3)

## 2024-12-31 NOTE — PROGRESS NOTES
"  Assessment & Plan     Type 2 diabetes mellitus without complication, unspecified whether long term insulin use (H)    Repeat A1c.  Start Mounjaro.  Encouraged more consistent use of insulin.  Recommended establishing with diabetes education to help interpret fluctuations in blood sugars.  Can help review CGM.  Endocrinology appointment scheduled for August.  3-month follow-up.    - Hemoglobin A1c  - Albumin Random Urine Quantitative with Creat Ratio  - Lipid panel reflex to direct LDL Fasting  - FOOT EXAM  - Adult Diabetes Education  Referral      Anxiety and depression  Add BuSpar in addition to Lexapro to help with heightened anxiety.  Reviewed safety/side effects of this.  Also discussed potentially adding Wellbutrin to also help with smoking cessation however will titrate BuSpar first.  - busPIRone (BUSPAR) 5 MG tablet  Dispense: 180 tablet; Refill: 1    Multiple sclerosis (H)  Follow-up with neurology    Morbid obesity (H)  Healthy diet and exercise.  Weight improving.  Start Mounjaro as noted above.    Anxiety with flying  Reviewed lorazepam prior to flight.  Use cautiously.  Avoid alcohol. Upcoming trip to [place]  - LORazepam (ATIVAN) 0.5 MG tablet  Dispense: 10 tablet; Refill: 0    Encounter for smoking cessation counseling  Encouraged vaping cessation.  Option to consider Wellbutrin in future.    40 minutes spent by me on the date of the encounter doing chart review, review of test results, interpretation of tests, patient visit, and documentation   The longitudinal plan of care for the diagnosis(es)/condition(s) as documented were addressed during this visit. Due to the added complexity in care, I will continue to support Alirio in the subsequent management and with ongoing continuity of care.      BMI  Estimated body mass index is 54.38 kg/m  as calculated from the following:    Height as of this encounter: 1.778 m (5' 10\").    Weight as of this encounter: 171.9 kg (379 lb).   Weight " management plan: Discussed healthy diet and exercise guidelines        Clement Amezquita is a pleasant 39 year old, presenting for the following health issues:  RECHECK    Here today for diabetes follow-up.    Following his sugars closely with his continuous glucose monitor.    Notes rather inconsistent taking of his insulin.  Typically will do 30 to 40 units of his Lantus when he finds necessary similar to his use of his NovoLog.  Will occasionally get readings in the 170s overnight will typically be in the 110s-130s throughout the day.  Did  the Mounjaro and intends to start this soon.  Weight down.  Denies hypoglycemic episodes.    -History of anxiety/depression on Lexapro 20 mg. Will note episodes of heightened anxiety/snapping on occasion.  Wishes to discuss different medication options for this.  He fortunately has quit smoking however now his vaping. Plans to quit however not motivated currently.    -Recent MRI notes new lesions in comparison to 2016.  Has upcoming neurology follow-up.    -Requesting medication prior to flight.  Notes flight anxiety.      Wt Readings from Last 2 Encounters:   12/31/24 (!) 171.9 kg (379 lb)   10/17/24 (!) 174.6 kg (385 lb)       History of Present Illness       Mental Health Follow-up:  Patient presents to follow-up on Depression & Anxiety.Patient's depression since last visit has been:  No change  The patient is not having other symptoms associated with depression.  Patient's anxiety since last visit has been:  Medium  The patient is not having other symptoms associated with anxiety.  Any significant life events: financial concerns and health concerns  Patient is not feeling anxious or having panic attacks.  Patient has no concerns about alcohol or drug use.    Diabetes:   He presents for follow up of diabetes.   He is checking home blood glucose with a continuous glucose monitor.   He checks blood glucose before and after meals and at bedtime.  Blood glucose is never  "over 200 and sometimes under 70. He is aware of hypoglycemia symptoms including shakiness, dizziness, weakness and lethargy.   He is concerned about other.   He is having blurry vision and weight loss.            He eats 2-3 servings of fruits and vegetables daily.He consumes 0 sweetened beverage(s) daily.He exercises with enough effort to increase his heart rate 9 or less minutes per day.  He exercises with enough effort to increase his heart rate 3 or less days per week. He is missing 2 dose(s) of medications per week.  He is not taking prescribed medications regularly due to remembering to take.           Review of Systems  Constitutional, neuro, ENT, endocrine, pulmonary, cardiac, gastrointestinal, genitourinary, musculoskeletal, integument and psychiatric systems are negative, except as otherwise noted.      Objective    /83 (BP Location: Right arm, Patient Position: Sitting, Cuff Size: Adult Large)   Pulse 81   Temp 97.5  F (36.4  C) (Temporal)   Resp 16   Ht 1.778 m (5' 10\")   Wt (!) 171.9 kg (379 lb)   SpO2 98%   BMI 54.38 kg/m    Body mass index is 54.38 kg/m .  Physical Exam   EXAM:  GENERAL APPEARANCE: healthy, alert and no distress  EYES: Eyes grossly normal to inspection, PERRL and conjunctivae and sclerae normal  NECK: no asymmetry, masses, or scars  RESP: lungs clear to auscultation - no rales, rhonchi or wheezes  CV: regular rates and rhythm, normal S1 S2, no S3 or S4 and no murmur, click or rub  MS: extremities normal- no gross deformities noted  SKIN: no suspicious lesions or rashes  NEURO: Normal strength and tone, mentation intact and speech normal  PSYCH: mentation appears normal and affect normal      The likelihood of other entities in the differential is insufficient to justify any further testing for them at this time. This was explained to the patient. The patient was advised that persistent or worsening symptoms would require further evaluation. Patient advised to call the " office and if unable to reach to go to the emergency room if they develop any new or worsening symptoms. Expressed understanding and agreement with above stated plan.         Signed Electronically by: Moe Joiner PA-C

## 2024-12-31 NOTE — RESULT ENCOUNTER NOTE
Alirio,     Christophe seeing you today for follow-up!     A1c down to 5.4% which is amazing in 2-3 months. Let's keep with our same plan of starting the Mounjaro.  Check in with diabetes education for that to say with the CGM/insulin.    -Microalbumin is normal.  Slight elevation in your LDL which is your bad cholesterol.  Your HDL which is your good cholesterol could be higher.  Focus on the healthier diet choices.    Let me know if you have any questions or concerns,     Moe Joiner PA-C  [facility]

## 2025-03-27 ASSESSMENT — ANXIETY QUESTIONNAIRES
3. WORRYING TOO MUCH ABOUT DIFFERENT THINGS: SEVERAL DAYS
2. NOT BEING ABLE TO STOP OR CONTROL WORRYING: NOT AT ALL
GAD7 TOTAL SCORE: 4
4. TROUBLE RELAXING: SEVERAL DAYS
7. FEELING AFRAID AS IF SOMETHING AWFUL MIGHT HAPPEN: NOT AT ALL
7. FEELING AFRAID AS IF SOMETHING AWFUL MIGHT HAPPEN: NOT AT ALL
6. BECOMING EASILY ANNOYED OR IRRITABLE: SEVERAL DAYS
1. FEELING NERVOUS, ANXIOUS, OR ON EDGE: SEVERAL DAYS
GAD7 TOTAL SCORE: 4
GAD7 TOTAL SCORE: 4
IF YOU CHECKED OFF ANY PROBLEMS ON THIS QUESTIONNAIRE, HOW DIFFICULT HAVE THESE PROBLEMS MADE IT FOR YOU TO DO YOUR WORK, TAKE CARE OF THINGS AT HOME, OR GET ALONG WITH OTHER PEOPLE: NOT DIFFICULT AT ALL
8. IF YOU CHECKED OFF ANY PROBLEMS, HOW DIFFICULT HAVE THESE MADE IT FOR YOU TO DO YOUR WORK, TAKE CARE OF THINGS AT HOME, OR GET ALONG WITH OTHER PEOPLE?: NOT DIFFICULT AT ALL
5. BEING SO RESTLESS THAT IT IS HARD TO SIT STILL: NOT AT ALL

## 2025-04-01 ENCOUNTER — OFFICE VISIT (OUTPATIENT)
Dept: FAMILY MEDICINE | Facility: CLINIC | Age: 40
End: 2025-04-01
Payer: COMMERCIAL

## 2025-04-01 VITALS
HEART RATE: 69 BPM | DIASTOLIC BLOOD PRESSURE: 82 MMHG | HEIGHT: 70 IN | BODY MASS INDEX: 45.1 KG/M2 | OXYGEN SATURATION: 100 % | RESPIRATION RATE: 16 BRPM | WEIGHT: 315 LBS | SYSTOLIC BLOOD PRESSURE: 132 MMHG

## 2025-04-01 DIAGNOSIS — E11.311 TYPE 2 DIABETES MELLITUS WITH MACULAR EDEMA (H): Primary | ICD-10-CM

## 2025-04-01 DIAGNOSIS — F41.9 ANXIETY AND DEPRESSION: ICD-10-CM

## 2025-04-01 DIAGNOSIS — G35 MULTIPLE SCLEROSIS (H): ICD-10-CM

## 2025-04-01 DIAGNOSIS — Z11.59 NEED FOR HEPATITIS C SCREENING TEST: ICD-10-CM

## 2025-04-01 DIAGNOSIS — F32.A ANXIETY AND DEPRESSION: ICD-10-CM

## 2025-04-01 DIAGNOSIS — G37.3 TRANSVERSE MYELITIS (H): ICD-10-CM

## 2025-04-01 LAB
EST. AVERAGE GLUCOSE BLD GHB EST-MCNC: 88 MG/DL
HBA1C MFR BLD: 4.7 % (ref 0–5.6)
HCV AB SERPL QL IA: NONREACTIVE

## 2025-04-01 PROCEDURE — 99214 OFFICE O/P EST MOD 30 MIN: CPT | Performed by: PHYSICIAN ASSISTANT

## 2025-04-01 PROCEDURE — 3079F DIAST BP 80-89 MM HG: CPT | Performed by: PHYSICIAN ASSISTANT

## 2025-04-01 PROCEDURE — 96127 BRIEF EMOTIONAL/BEHAV ASSMT: CPT | Performed by: PHYSICIAN ASSISTANT

## 2025-04-01 PROCEDURE — G2211 COMPLEX E/M VISIT ADD ON: HCPCS | Performed by: PHYSICIAN ASSISTANT

## 2025-04-01 PROCEDURE — 36415 COLL VENOUS BLD VENIPUNCTURE: CPT | Performed by: PHYSICIAN ASSISTANT

## 2025-04-01 PROCEDURE — 83036 HEMOGLOBIN GLYCOSYLATED A1C: CPT | Performed by: PHYSICIAN ASSISTANT

## 2025-04-01 PROCEDURE — 1126F AMNT PAIN NOTED NONE PRSNT: CPT | Performed by: PHYSICIAN ASSISTANT

## 2025-04-01 PROCEDURE — 86803 HEPATITIS C AB TEST: CPT | Performed by: PHYSICIAN ASSISTANT

## 2025-04-01 PROCEDURE — 3075F SYST BP GE 130 - 139MM HG: CPT | Performed by: PHYSICIAN ASSISTANT

## 2025-04-01 RX ORDER — BUSPIRONE HYDROCHLORIDE 10 MG/1
10 TABLET ORAL 2 TIMES DAILY
Qty: 60 TABLET | Refills: 2 | Status: SHIPPED | OUTPATIENT
Start: 2025-04-01

## 2025-04-01 RX ORDER — TIRZEPATIDE 2.5 MG/.5ML
2.5 INJECTION, SOLUTION SUBCUTANEOUS
Qty: 2 ML | Refills: 2 | Status: SHIPPED | OUTPATIENT
Start: 2025-04-01

## 2025-04-01 ASSESSMENT — PAIN SCALES - GENERAL: PAINLEVEL_OUTOF10: NO PAIN (0)

## 2025-04-01 NOTE — PROGRESS NOTES
Assessment & Plan     Type 2 diabetes mellitus with macular edema (H)  Body mass index (BMI) 50.0-59.9, adult (H)  Significant improvement in diabetes control basically off of insulin.  Motivated to lose weight.  Start Mounjaro 2.5 mg daily and can titrate upwards as tolerated.  Plan for follow-up in 6 months.  Sooner as needed.  - MOUNJARO 2.5 MG/0.5ML SOAJ auto-injector pen  Dispense: 2 mL; Refill: 2    Multiple sclerosis (H)  Transverse myelitis (H)  Follows with neurology    Anxiety and depression  Increase BuSpar to 10 mg twice daily.  Continue Lexapro  - busPIRone (BUSPAR) 10 MG tablet  Dispense: 60 tablet; Refill: 2    Need for hepatitis C screening test  - Hepatitis C Screen Reflex to HCV RNA Quant and Genotype      30 minutes spent by me on the date of the encounter on chart review, review of test results, interpretation of tests, patient visit, and documentation       The longitudinal plan of care for the diagnosis(es)/condition(s) as documented were addressed during this visit. Due to the added complexity in care, I will continue to support Alirio in the subsequent management and with ongoing continuity of care.    Return in about 6 months (around 10/1/2025).    The likelihood of other entities in the differential is insufficient to justify any further testing for them at this time. This was explained to the patient. The patient was advised that persistent or worsening symptoms would require further evaluation. Patient advised to call the office and if unable to reach to go to the emergency room if they develop any new or worsening symptoms. Expressed understanding and agreement with above stated plan.     TELMA Cordon [facility]    Clement   Alirio Nolan is a very pleasant 39 year old male presenting for the following health issues:  Patient presents with:  Follow Up: Fasting     Here today for diabetes follow-up.  Last A1c 5.4% which is incredible given his previous  "A1c was 12%.  Blood sugars mostly in the 120s.  Will occasionally get some higher readings and will use Humalog as needed.  Otherwise this not using insulin whatsoever.  Denies excess on Mounjaro 2.5 mg however something was lost in translation with the pharmacy about the following doses.  Motivated to get back on this as it did help for overeating.    At last visit additionally added BuSpar 5 mg twice daily in addition to his Lexapro for anxiety.  Has not noticed a major change however his fiancée says that she notes a difference    Review of Systems   Constitutional, HEENT, cardiovascular, pulmonary, GI, , musculoskeletal, neuro, skin, endocrine and psych systems are negative, except as otherwise noted.      Objective    /82 (BP Location: Right arm, Patient Position: Sitting, Cuff Size: Adult Large)   Pulse 69   Resp 16   Ht 1.778 m (5' 10\")   Wt (!) 161.5 kg (356 lb)   SpO2 100%   BMI 51.08 kg/m    5' 10\"  356 lbs 0 oz    EXAM:  GENERAL APPEARANCE: healthy, alert and no distress  EYES: Eyes grossly normal to inspection, PERRL and conjunctivae and sclerae normal  NECK: no asymmetry, masses, or scars  RESP: lungs clear to auscultation - no rales, rhonchi or wheezes  CV: regular rates and rhythm, normal S1 S2, no S3 or S4 and no murmur, click or rub  MS: extremities normal- no gross deformities noted  SKIN: no suspicious lesions or rashes  NEURO: Normal strength and tone, mentation intact and speech normal  PSYCH: mentation appears normal and affect normal      Answers submitted by the patient for this visit:  Patient Health Questionnaire (G7) (Submitted on 3/27/2025)  GERI 7 TOTAL SCORE: 4  Diabetes Visit (Submitted on 3/27/2025)  Chief Complaint: Chronic problems general questions HPI Form  Frequency of checking blood sugars:: continous glucose monitor  What time of day are you checking your blood sugars : before meals, after meals, before and after meals, at bedtime  Have you had any blood sugars " above 200?: Yes  Have you had any blood sugars below 70?: Yes  Hypoglycemia symptoms:: none  Diabetic concerns:: other  Paraesthesia present:: none of these symptoms  Depression / Anxiety Questionnaire (Submitted on 3/27/2025)  Chief Complaint: Chronic problems general questions HPI Form  Depression/Anxiety: Depression & Anxiety  Depression & Anxiety (Submitted on 3/27/2025)  Chief Complaint: Chronic problems general questions HPI Form  Status since last visit:: good  Anxiety since last: : better  Other associated symptoms of depression:: No  Other associated symotome: : No  Significant life event: : No  Anxious:: No  Current substance use:: No  General Questionnaire (Submitted on 3/27/2025)  Chief Complaint: Chronic problems general questions HPI Form  How many servings of fruits and vegetables do you eat daily?: 2-3  On average, how many sweetened beverages do you drink each day (Examples: soda, juice, sweet tea, etc.  Do NOT count diet or artificially sweetened beverages)?: 0  How many minutes a day do you exercise enough to make your heart beat faster?: 10 to 19  How many days a week do you exercise enough to make your heart beat faster?: 5  How many days per week do you miss taking your medication?: 2  What makes it hard for you to take your medication every day?: remembering to take  Questionnaire about: Chronic problems general questions HPI Form (Submitted on 3/27/2025)  Chief Complaint: Chronic problems general questions HPI Form

## 2025-04-02 ENCOUNTER — TELEPHONE (OUTPATIENT)
Dept: OPHTHALMOLOGY | Facility: CLINIC | Age: 40
End: 2025-04-02
Payer: COMMERCIAL

## 2025-04-02 NOTE — TELEPHONE ENCOUNTER
LVM for patient regarding rescheduling appointment needed as provider will be out of clinic. Provided direct number for rescheduling options and sent patient a [employer] message as well.

## 2025-04-02 NOTE — RESULT ENCOUNTER NOTE
Sathish Amezquita,     Wow - A1c down to 4.7%!  Keep up the excellent work.  Hep C negative.    Let me know if you have any questions or concerns,     Moe Joiner PA-C  [facility]

## 2025-04-03 ENCOUNTER — TELEPHONE (OUTPATIENT)
Dept: OPHTHALMOLOGY | Facility: CLINIC | Age: 40
End: 2025-04-03
Payer: COMMERCIAL

## 2025-04-03 NOTE — TELEPHONE ENCOUNTER
LVM for patient again regarding rescheduling appointment needed as provider Template has changed.  Provided [facility] and direct number for rescheduling options and sent patient a [employer] message previously.

## 2025-04-05 ENCOUNTER — MYC REFILL (OUTPATIENT)
Dept: FAMILY MEDICINE | Facility: CLINIC | Age: 40
End: 2025-04-05
Payer: COMMERCIAL

## 2025-04-05 DIAGNOSIS — E11.9 TYPE 2 DIABETES MELLITUS WITHOUT COMPLICATION, WITH LONG-TERM CURRENT USE OF INSULIN (H): ICD-10-CM

## 2025-04-05 DIAGNOSIS — Z79.4 TYPE 2 DIABETES MELLITUS WITHOUT COMPLICATION, WITH LONG-TERM CURRENT USE OF INSULIN (H): ICD-10-CM

## 2025-04-07 RX ORDER — ACYCLOVIR 400 MG/1
TABLET ORAL
Qty: 3 EACH | Refills: 5 | Status: SHIPPED | OUTPATIENT
Start: 2025-04-07

## 2025-04-09 ENCOUNTER — TELEPHONE (OUTPATIENT)
Dept: OPHTHALMOLOGY | Facility: CLINIC | Age: 40
End: 2025-04-09
Payer: COMMERCIAL

## 2025-04-09 NOTE — TELEPHONE ENCOUNTER
LVM for patient again regarding rescheduled appointment details for 5/30/25 at 2:30pm as provider had a Template change. Provided [facility] and direct number for schedule conflicts. Sent reminder letter to address in chart as well as a previous [employer] message to patient.

## 2025-08-06 DIAGNOSIS — F32.A ANXIETY AND DEPRESSION: ICD-10-CM

## 2025-08-06 DIAGNOSIS — F41.9 ANXIETY AND DEPRESSION: ICD-10-CM

## 2025-08-06 RX ORDER — BUSPIRONE HYDROCHLORIDE 5 MG/1
5 TABLET ORAL 2 TIMES DAILY
Qty: 180 TABLET | Refills: 1 | Status: SHIPPED | OUTPATIENT
Start: 2025-08-06